# Patient Record
Sex: FEMALE | Race: WHITE | NOT HISPANIC OR LATINO | Employment: FULL TIME | URBAN - METROPOLITAN AREA
[De-identification: names, ages, dates, MRNs, and addresses within clinical notes are randomized per-mention and may not be internally consistent; named-entity substitution may affect disease eponyms.]

---

## 2018-11-13 ENCOUNTER — OFFICE VISIT CONVERTED (OUTPATIENT)
Dept: ONCOLOGY | Facility: HOSPITAL | Age: 23
End: 2018-11-13
Attending: INTERNAL MEDICINE

## 2018-12-04 ENCOUNTER — OFFICE VISIT CONVERTED (OUTPATIENT)
Dept: ONCOLOGY | Facility: HOSPITAL | Age: 23
End: 2018-12-04
Attending: INTERNAL MEDICINE

## 2019-01-17 ENCOUNTER — OFFICE VISIT CONVERTED (OUTPATIENT)
Dept: ONCOLOGY | Facility: HOSPITAL | Age: 24
End: 2019-01-17
Attending: NURSE PRACTITIONER

## 2019-01-17 ENCOUNTER — HOSPITAL ENCOUNTER (OUTPATIENT)
Dept: ONCOLOGY | Facility: HOSPITAL | Age: 24
Discharge: HOME | End: 2019-01-17
Attending: NURSE PRACTITIONER

## 2021-01-19 LAB
EXTERNAL HEPATITIS B SURFACE ANTIGEN: NEGATIVE
EXTERNAL HEPATITIS C AB: NEGATIVE
EXTERNAL RUBELLA QUALITATIVE: NORMAL
EXTERNAL VDRL: NEGATIVE
HIV1 P24 AG SERPL QL IA: NEGATIVE
HM PAP SMEAR: NORMAL

## 2021-01-22 ENCOUNTER — OFFICE VISIT (OUTPATIENT)
Dept: CARDIOLOGY | Facility: CLINIC | Age: 26
End: 2021-01-22

## 2021-01-22 VITALS
SYSTOLIC BLOOD PRESSURE: 130 MMHG | DIASTOLIC BLOOD PRESSURE: 82 MMHG | HEIGHT: 72 IN | WEIGHT: 161 LBS | HEART RATE: 73 BPM | BODY MASS INDEX: 21.81 KG/M2

## 2021-01-22 DIAGNOSIS — I10 ESSENTIAL HYPERTENSION: Primary | ICD-10-CM

## 2021-01-22 PROCEDURE — 93000 ELECTROCARDIOGRAM COMPLETE: CPT | Performed by: INTERNAL MEDICINE

## 2021-01-22 PROCEDURE — 99204 OFFICE O/P NEW MOD 45 MIN: CPT | Performed by: INTERNAL MEDICINE

## 2021-01-22 RX ORDER — ACETAMINOPHEN 500 MG
TABLET ORAL AS NEEDED
COMMUNITY
End: 2021-06-23

## 2021-01-22 RX ORDER — LABETALOL 100 MG/1
TABLET, FILM COATED ORAL
COMMUNITY
Start: 2020-10-12 | End: 2021-06-23

## 2021-01-22 RX ORDER — NIFEDIPINE 30 MG/1
30 TABLET, EXTENDED RELEASE ORAL DAILY
COMMUNITY
Start: 2020-10-26 | End: 2021-06-23

## 2021-01-22 RX ORDER — VITAMIN A ACETATE, BETA CAROTENE, ASCORBIC ACID, CHOLECALCIFEROL, .ALPHA.-TOCOPHEROL ACETATE, DL-, THIAMINE MONONITRATE, RIBOFLAVIN, NIACINAMIDE, PYRIDOXINE HYDROCHLORIDE, FOLIC ACID, CYANOCOBALAMIN, CALCIUM CARBONATE, FERROUS FUMARATE, ZINC OXIDE, CUPRIC OXIDE 3080; 12; 120; 400; 1; 1.84; 3; 20; 22; 920; 25; 200; 27; 10; 2 [IU]/1; UG/1; MG/1; [IU]/1; MG/1; MG/1; MG/1; MG/1; MG/1; [IU]/1; MG/1; MG/1; MG/1; MG/1; MG/1
1 TABLET, FILM COATED ORAL DAILY
COMMUNITY
End: 2022-08-22

## 2021-01-22 NOTE — PROGRESS NOTES
Subjective:     Encounter Date:01/22/2021      Patient ID: Ana Brito is a 25 y.o. female.    Chief Complaint: Hypertension  HPI:   This is a 25-year-old woman who presents for evaluation of hypertension.  She is 7 weeks pregnant.  She has chronic hypertension dating back to high school.  This is her second pregnancy, and with the first pregnancy she was induced at 37 weeks due to severe hypertension without help syndrome or eclampsia.  The patient was previously maintained on lisinopril within the last several years while not pregnant.  This apparently controlled her blood pressures relatively well.  She was transitioned to labetalol 100 twice daily when she became pregnant.  This did not control her pressures.  She was having diastolics in the 90s to 100s.  Nifedipine 30 mg daily was added.  Since then her blood pressures of averaged in the 130s over 80s.  When hypertensive she gets a severe headache and has visual disturbance as well.  She never has dizziness or presyncope.  She does not get paresthesias, chest pain or shortness of breath with hypertension.  Thus far the pregnancy has been uncomplicated.  She is feeling well.  She does not do any regular exercise.  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  There is no family history of hypertension or coronary disease.    The following portions of the patient's history were reviewed and updated as appropriate: allergies, current medications, past family history, past medical history, past social history, past surgical history and problem list.     REVIEW OF SYSTEMS:   All systems reviewed.  Pertinent positives identified in HPI.  All other systems are negative.    Past Medical History:   Diagnosis Date   • History of blood clot to lungs during pregnancy     After pregnancy   • Hypertension        Family History   Problem Relation Age of Onset   • Cancer Maternal Grandmother    • Hyperlipidemia Maternal Grandfather    • Cancer Paternal Grandmother         Social History     Socioeconomic History   • Marital status:      Spouse name: Not on file   • Number of children: Not on file   • Years of education: Not on file   • Highest education level: Not on file   Tobacco Use   • Smoking status: Never Smoker   • Smokeless tobacco: Never Used   Substance and Sexual Activity   • Alcohol use: Yes     Comment: occ   • Drug use: Not Currently       No Known Allergies    Past Surgical History:   Procedure Laterality Date   • KIDNEY STONE SURGERY  01/12/2021   • MULTIPLE TOOTH EXTRACTIONS           ECG 12 Lead    Date/Time: 1/22/2021 1:27 PM  Performed by: Gaby Cornejo MD  Authorized by: Gaby Cornejo MD   Comparison: not compared with previous ECG   Rhythm: sinus rhythm  Rate: normal  Conduction: conduction normal  ST Segments: ST segments normal  T Waves: T waves normal  QRS axis: normal  Other: no other findings    Clinical impression: normal ECG             Objective:         PHYSICAL EXAM:  GEN: VSS, no distress,   Eyes: normal sclera, normal lids and lashes  HENT: moist mucus membranes,   Respiratory: CTAB, no rales or wheezes  CV: RRR, no murmurs, , +2 DP and 2+ carotid pulses b/l  GI: NABS, soft,  Nontender, nondistended  MSK: no edema, no scoliosis or kyphosis  Skin: no rash, warm, dry  Heme/Lymph: no bruising or bleeding  Psych: organized thought, normal behavior and affect  Neuro: Cranial nerves grossly intact, Alert and Oriented x 3.         Assessment:          Diagnosis Plan   1. Essential hypertension            Plan:       This is a 25-year-old woman with chronic hypertension.  She is now 7 weeks pregnant.  Her blood pressures are well controlled on labetalol 100 twice daily and nifedipine 30.  She will continue to measure her blood pressures daily.  We will adjust medications accordingly.  As her pressures are controlled on 2 drugs I do not think she needs a secondary hypertension work-up at this point, though we can revisit this after her  pregnancy.  She will be seeing maternal-fetal medicine specialist which will help plan her delivery next week.    Teetee, thank you very much for referring this kind patient to me. Please call me with any questions or concerns. I will see the patient again in the office in June.         Gaby Cornejo MD  01/22/21  [facility]    Outpatient Encounter Medications as of 1/22/2021   Medication Sig Dispense Refill   • acetaminophen (TYLENOL) 500 MG tablet Take  by mouth As Needed.     • enoxaparin (LOVENOX) 40 MG/0.4ML solution syringe Inject  under the skin into the appropriate area as directed.     • labetalol (NORMODYNE) 100 MG tablet TK 1 T PO BID     • NIFEdipine XL (PROCARDIA XL) 30 MG 24 hr tablet Take 30 mg by mouth Daily.     • prenatal vitamins (PRENATAL 27-1) 27-1 MG tablet tablet Take 1 tablet by mouth Daily.       No facility-administered encounter medications on file as of 1/22/2021.

## 2021-01-27 ENCOUNTER — TELEPHONE CONVERTED (OUTPATIENT)
Dept: UROLOGY | Facility: CLINIC | Age: 26
End: 2021-01-27
Attending: UROLOGY

## 2021-05-14 NOTE — PROGRESS NOTES
Progress Note      Patient Name: Ana Brito   Patient ID: [id number]   Sex: Female   YOB: 1995        Visit Date: January 27, 2021    Provider: Norma Rodriguez MD   Location: [facility]   Location Address: [address] [place] [address]  [id number]   Location Phone: [phone]          History Of Present Illness  TELEHEALTH TELEPHONE VISIT  Ana Brito is a 25 year old female who is presenting for evaluation via telehealth telephone visit. Verbal consent obtained before beginning visit.   Provider spent 7 minutes with the patient during the telehealth visit.   The following staff were present during this visit: Shahla Bhatti   Past Medical History/ Overview of Patient Symptoms  The patient returns for a scheduled post-operative visit after undergoing left ureteroscopy and stone basket extraction for left ureteral calculus on 01/12/2021. A double J stent was inserted but has been removed by patient at home.   Since the procedure, the patient has had no significant problems.       Past Medical History  HTN (hypertension)         Past Surgical History  Kidney Stone Surgery, Unspecified; [place] Tooth Extraction         Medication List  labetalol 100 mg oral tablet; Lovenox subcutaneous; nifedipine 30 mg oral tablet extended release; Prenatal 400 mcg oral tablet,chewable         Allergy List  NO KNOWN DRUG ALLERGIES       Allergies Reconciled  Family Medical History  Kidney stones         Social History  Tobacco (Never)         Review of Systems  · Constitutional  o Denies  o : fever, chills  · Gastrointestinal  o Denies  o : nausea, vomiting, change in abdominal girth, diarrhea, constipation, blood in stools  · Genitourinary  o Denies  o : additional symptoms, except as noted in HPI              Assessment  · Calculus of distal left ureter     592.1/N20.1      Plan  · Orders  o Physican Telephone evaluation, 5-10 min ([id number]) - 592.1/N20.1 - 01/27/2021  o KUB xray [facility]  Preferred View ([id number]) - 592.1/N20.1 - 01/27/2022  · Medications  o Medications have been Reconciled  o Transition of Care or Provider Policy  · Instructions  o The left uteretral stent was removed today in the office without difficulty. The patient will return to the office in 12 months for KUB. I will see him/her sooner if he/she has any no stone symptoms.             Electronically Signed by: Norma Rodriguez MD -Author on January 27, 2021 01:08:10 PM

## 2021-05-28 VITALS
DIASTOLIC BLOOD PRESSURE: 88 MMHG | OXYGEN SATURATION: 99 % | SYSTOLIC BLOOD PRESSURE: 135 MMHG | HEIGHT: 62 IN | HEART RATE: 82 BPM | BODY MASS INDEX: 28.36 KG/M2 | WEIGHT: 154.1 LBS | TEMPERATURE: 98 F

## 2021-05-28 VITALS
DIASTOLIC BLOOD PRESSURE: 94 MMHG | SYSTOLIC BLOOD PRESSURE: 135 MMHG | HEART RATE: 86 BPM | HEART RATE: 82 BPM | BODY MASS INDEX: 28.4 KG/M2 | WEIGHT: 154.32 LBS | HEIGHT: 62 IN | HEIGHT: 62 IN | TEMPERATURE: 98.5 F | BODY MASS INDEX: 28.11 KG/M2 | SYSTOLIC BLOOD PRESSURE: 143 MMHG | WEIGHT: 152.78 LBS | DIASTOLIC BLOOD PRESSURE: 88 MMHG | OXYGEN SATURATION: 99 % | TEMPERATURE: 98.3 F | OXYGEN SATURATION: 100 %

## 2021-05-28 NOTE — PROGRESS NOTES
Patient: LUIS ENRIQUE WYNNE     Acct: [id number]     Report: #[id number]  UNIT #: [id number]     : 1995    Encounter Date:2018  PRIMARY CARE: JOSE MOORE  ***Signed***  --------------------------------------------------------------------------------------------------------------------  NURSE INTAKE      Visit Type      Established Patient Visit            Chief Complaint      PULMONARY EMBOLISM            Referring Provider/Copies To      Referring Provider:  JOSE MOORE            History and Present Illness      Past History      Mr. Wynne is a very pleasant 23-year-old lady who is being referred to us     because of recent history of pulmonary embolism. Patient presented with an     anxiety attack as well as chest and neck discomfort while at work. She was     evaluated in the emergency room with a CT scan of the chest because of her     history of hormone contraceptive therapy. Patient was found to have evidence of     pulmonary embolism in one of the segmental branches of the left lower lobe     pulmonary artery. Since then patient has been maintained on Eliquis     anticoagulation which she is tolerating very well. Patient also gives positive     family history of thromboembolism. One of her cousins had DVT and 2 of her     grandmothers also had history of thromboembolism and required anticoagulation.     Overall patient has significantly improved without any dyspnea or hypoxemia at     this point. She did not undergo any ultrasound evaluation of the lower     extremities.            July 3-.  Thrombophilia workup was negative      2018. Presents for follow up after initial evaluation for PE workup in    the ER and started on Eliquis in early . CT showed. PE in the LLL segmental     branch of the lung. Tolerating Elquis well. Will continue x 6 months.     Discontinued Nuva-ring.      .  WBC 4.75.  Hemoglobin 13.4.  Platelet count 237,000             HPI - Hematology Interim      Patient presents today for evaluation of chest pain in setting of pulmonary     thromboembolism.            PAST, FAMILY   Social History      Lives independently:  No            Tobacco Use      Tobacco status:  Never smoker            Alcohol Use      Alcohol intake:  OCCASSIONALLY            Substance Use      Substance use:  Denies use            REVIEW OF SYSTEMS      General:  Admits: Fatigue;          Denies: Appetite Change, Fever, Night Sweats, Weight Gain, Weight Loss      Eye:  Denies: Blurred Vision, Corrective Lenses, Diplopia, Eye Irritation, Eye     Pain, Eye Redness, Spots in Vision, Vision Loss      ENT:  Denies: Headache, Hearing Loss, Hoarseness, Seizures, Sinus Congestion,     Sore Throat      Cardiovascular:  Admits: Chest Pain;          Denies: Edema Ankles, Edema Legs, Irregular Heartbeat, Palpitations      Respiratory:  Denies: Coughing Blood, Productive Cough, Shortness of Air,     Wheezing      Gastrointestinal:  Denies: Bloody Stools, Constipation, Diarrhea, Frequent     Heartburn, Nausea, Problem Swallowing, Tarry Stools, Unable to Control Bowels,     Vomiting      Genitourinary (female):  Denies: Blood in Urine, Decrease Urine Stream, Frequent    Urination, Incontinence, Painful Urination      Musculoskeletal:  Denies: Back Pain, Leg Cramps, Muscle Pain, Muscle Weakness,     Painful Joints, Swollen Joints      Integumentary:  Denies: Bleeds Easily, Bruises Easily, Hair Changes, Jaundice,     Lesions, Mole Changes, Nail Changes, Pigment Changes, Rash, Skin Discoloration      Neurologic:  Denies: Dizziness, Fainting, Numbness\Tingling, Paralysis, Seizures      Psychiatric:  Denies: Anxiety, Confused, Depression, Disoriented, Memory Loss      Endocrine:  Denies: Cold Intolerance, Diabetes, Excessive Sweating, Excessive     Thirst, Excessive Urination, Heat Intolerance, Flushing, Hyperthyroidism,     Hypothyroidism      Hematologic/Lymphatic:  Denies:  Bruising, Bleeding, Enlarged Lymph Nodes,     Recurrent Infections, Transfusions            VITAL SIGNS AND SCORES      Vitals      Height 5 ft 1.93 in / 157.3 cm      Weight 152 lbs 12.460 oz / 69.3 kg      BSA 1.70 m2      BMI 28.0 kg/m2      Temperature 98.5 F / 36.94 C - Temporal      Pulse 86      Blood Pressure 143/88 Sitting, Left Arm      Pulse Oximetry 99%, ROOM AIR            Pain Score      Experiencing any pain?:  No      Pain Scale Used:  Numerical      Pain Intensity:  0            Fatigue Score      Experiencing any fatigue?:  No      Fatigue (0-10 scale):  0 (none)            EXAM      General Appearance:  Positive for: Alert, Oriented x3, Cooperative      Eye:  Positive for: Anicteric Sclerae, Moist Conjunctiva, PERRLA      HEENT:  Positive for: Oropharynx clear;          Negative for: Dentition, Erythema      Neck:  Positive for: Full ROM;          Negative for: JVD      Respiratory:  Positive for: CTAB;          Negative for: Barrel Chested, Crackles, Diminished Breath, Dullness to     Percussion, Normal Respiratory Effort, Rales, Rhochi, Stridor      Breast - Left:  Negative for: Adenopathy, Appearance, Discharge, Mass, Skin     Changes      Breast - Right:  Negative for: Adenopathy, Appearance, Discharge, Mass, Skin     Changes      Abdomen/Gastro:  Positive for: Normal Active Bowel Sounds;          Negative for: Hepatosplenomegaly      Cardiovascular:  Positive for: Normal PMI, RRR      Skin:  Positive for: Normal Temperature, Normal Texture and Turgor, Normal Tone      Psychiatric:  Positive for: AAO X 3, Appropriate Affect      Neurologic:  Positive for: Cranial Ner II-XII Intact, Deep Tendon Reflexes      Musculoskeletal:  Positive for: Full ROM Lower Extremety, Full ROM Upper     Extremety, Full Muscle Strength      Lower Extremities:  Positive for: Normal Gait and Station;          Negative for: Edema      Upper Extremities:  Negative for: Clubbing, Deformities, Digital Cyanosis,      Digital Ischemia, Edema, Weakness      Lymphatic:  Negative for: Axillary, Cervical, Epitrochlear, Femoral,     Infraclavicular, Inguinal, Occipital, Popliteal, Posterior auricular,     Preauricular, Supraclavicular            PREVENTION      Hx Influenza Vaccination:  Yes      Date Influenza Vaccine Given:  Oct 1, 2017      Influenza Vaccine Declined:  No      2 or More Falls Past Year?:  No      Fall Past Year with Injury?:  No      Hx Pneumococcal Vaccination:  No      Encouraged to follow-up with:  PCP regarding preventative exams.      Chart initiated by      DARIANA GEIGER CMA            ALLERGY/MEDS      Allergies      Coded Allergies:             NO KNOWN DRUG ALLERGIES (Verified  Allergy, Unknown, 11/13/18)            Medications      Last Reconciled on 11/15/18 08:18 by KASIE LOPEZ MD      Apixaban (Eliquis) 5 Mg Tablet      5 MG PO BID for 30 Days, #60 TAB         Reported         6/28/18      Medications Reviewed:  No Changes made to meds            IMPRESSION/PLAN      Diagnosis      Pulmonary embolism - I26.99      -Provoked PE.  Due to underlying estrogen-based oral contraceptive use and     decreased mobility      -Continue with Eliquis 5 mg BID. Patient will continue Eliquis for 6 months. She    was started on Eliquis in early June.       -Thrombophilia workup is negative.       -Discussed with patient completing 6 months of treatment      -D-dimer level was normal            Chest pain - R07.9      -Nonpruritic chest pain      -Reproducible with palpation in the right rib area      -Likely costochondritis      -Recommend NSAIDs            Iron deficiency - E61.1      -Symptoms have improved      Check iron levels are next visit            Notes      -Today's visit is an encounter for hematology evaluation and treatment.       -Patient's radiology exams, blood tests, physicians' notes, and medications were    reviewed today to assess patient's medical treatment plan.      -Radiology imaging  tests from June 2018 to today were reviewed independently by     me by direct visualization of the images.        -Old medical records were reviewed and summarized in chronological order in the     HPI today to maintain an updated medical record.      New Diagnostics      * CBC With Auto Diff, Routine         Dx: Pulmonary embolism - I26.99      * D-Dimer Quantitative, Routine         Dx: Pulmonary embolism - I26.99            Plan      -Return to clinic in 1 month to discuss stopping Eliquis.  Check d-dimer for     chest pain            Patient Education      Patient Education Provided:  Yes                 Disclaimer: Converted document may not contain table formatting or lab diagrams. Please see [employer] for the authenticated document.

## 2021-05-28 NOTE — PROGRESS NOTES
Patient: LUIS ENRIQUE WYNNE     Acct: [id number]     Report: #[id number]  UNIT #: [id number]     : 1995    Encounter Date:2019  PRIMARY CARE: JOSE MOORE  ***Signed***  --------------------------------------------------------------------------------------------------------------------  NURSE INTAKE      Visit Type      Established Patient Visit            Chief Complaint      Pulmonary emboli (LLL pulmonary artery diagnosed 2018)            Referring Provider/Copies To      Referring Provider:  JOSE MOORE      Primary Care Provider:  JOSE MOORE      Copies To:   Cristal Grace ;            History and Present Illness      Past History      Mr. Wynne is a very pleasant 23-year-old lady who is being referred to us     because of recent history of pulmonary embolism seen in ER on 2018.     Patient presented with an anxiety attack as well as chest and neck discomfort     while at work. She was evaluated in the emergency room with a CT scan of the     chest because of her history of hormone contraceptive therapy. Patient was found    to have evidence of pulmonary embolism in one of the segmental branches of the     left lower lobe pulmonary artery. Since then patient has been maintained on     Eliquis anticoagulation which she is tolerating very well. Patient also gives     positive family history of thromboembolism. One of her cousins had DVT and 2 of     her grandmothers also had history of thromboembolism and required     anticoagulation.  Thrombophilia work up completed and was deemed negative.  Plan    is to maintain her on anticoagulation for 6 months.  She now has an IUD.            HPI - Hematology Interim      Here today in follow up.  Has been of anticoagulation x 6 weeks.  No longer     taking oral contraceptives. Has an IUD in place.  Has had no SOA, RAMÍREZ, leg     swelling or leg pain.            PAST, FAMILY   Past Medical History      Other PMH      NONE      Other  Hematology/Oncology      PE            Past Surgical History      ORAL SURGERY 10 TEETH REMOVED            Family History      Family History:  Breast Cancer (GREAT AUNT, MATERNAL GRANDMOTHER), Colorectal     Cancer (MATERNAL GRANDMOTHER)      MOMS MOTHERS SISTER-BLOOD CLOT IN LEGS            Social History      Marital Status:        Lives independently:  No            Tobacco Use      Tobacco status:  Never smoker            Alcohol Use      Alcohol intake:  OCCASSIONALLY            Substance Use      Substance use:  Denies use            REVIEW OF SYSTEMS      General:  Denies: Appetite Change, Fatigue, Fever, Night Sweats, Weight Gain,     Weight Loss      Eye:  Denies: Blurred Vision, Corrective Lenses, Diplopia, Eye Irritation, Eye     Pain, Eye Redness, Spots in Vision, Vision Loss      ENT:  Denies: Headache, Hearing Loss, Hoarseness, Seizures, Sinus Congestion,     Sore Throat      Cardiovascular:  Denies: Chest Pain, Edema Ankles, Edema Legs, Irregular     Heartbeat, Palpitations      Respiratory:  Denies: Coughing Blood, Productive Cough, Shortness of Air,     Wheezing      Gastrointestinal:  Denies: Bloody Stools, Constipation, Diarrhea, Frequent     Heartburn, Nausea, Problem Swallowing, Tarry Stools, Unable to Control Bowels,     Vomiting      Genitourinary (female):  Denies: Blood in Urine, Decrease Urine Stream, Frequent    Urination, Incontinence, Painful Urination      Musculoskeletal:  Denies: Back Pain, Leg Cramps, Muscle Pain, Muscle Weakness,     Painful Joints, Swollen Joints      Integumentary:  Denies: Bleeds Easily, Bruises Easily, Hair Changes, Jaundice,     Lesions, Mole Changes, Nail Changes, Pigment Changes, Rash, Skin Discoloration      Neurologic:  Denies: Dizziness, Fainting, Numbness\Tingling, Paralysis, Seizures      Psychiatric:  Denies: Anxiety, Confused, Depression, Disoriented, Memory Loss      Endocrine:  Denies: Cold Intolerance, Diabetes, Excessive Sweating, Excessive      Thirst, Excessive Urination, Heat Intolerance, Flushing, Hyperthyroidism,     Hypothyroidism      Hematologic/Lymphatic:  Denies: Bruising, Bleeding, Enlarged Lymph Nodes,     Recurrent Infections, Transfusions      Reproductive:  Denies: Menopause, Heavy Periods, Pregnant, Still Menstruating            VITAL SIGNS AND SCORES      Vitals      Height 5 ft 1.93 in / 157.3 cm      Weight 154 lbs 1.625 oz / 69.9 kg      BSA 1.71 m2      BMI 28.3 kg/m2      Temperature 98.0 F / 36.67 C - Temporal      Pulse 82      Blood Pressure 135/88 Sitting, Left Arm      Pulse Oximetry 99%, ROOM AIR            Pain Score      Experiencing any pain?:  No      Pain Scale Used:  Numerical      Pain Intensity:  0            Fatigue Score      Experiencing any fatigue?:  No      Fatigue (0-10 scale):  0 (none)            EXAM      General Appearance:  Positive for: Alert, Oriented x3, Cooperative      Eye:  Positive for: Anicteric Sclerae, Moist Conjunctiva, PERRLA      HEENT:  Positive for: Oropharynx clear;          Negative for: Erythema, Exudates, Pallor      Neck:  Positive for: Full ROM, Supple;          Negative for: Masses      Respiratory:  Positive for: CTAB, Normal Respiratory Effort;          Negative for: Diminished Breath      Abdomen/Gastro:  Positive for: Normal Active Bowel Sounds, Soft;          Negative for: Hepatosplenomegaly, Tenderness      Cardiovascular:  Positive for: Normal PMI, RRR;          Negative for: Peripheral Edema      Psychiatric:  Positive for: AAO X 3, Appropriate Affect, Intact Judgement      Neurologic:  Positive for: Cranial Ner II-XII Intact      Musculoskeletal:  Positive for: Full ROM Lower Extremety, Full Muscle Strength,     Full Muscle Tone      Lymphatic:  Negative for: Axillary, Cervical, Infraclavicular, Supraclavicular            PREVENTION      Hx Influenza Vaccination:  Yes      Date Influenza Vaccine Given:  Oct 1, 2017      Influenza Vaccine Declined:  No      2 or More Falls  Past Year?:  No      Fall Past Year with Injury?:  No      Hx Pneumococcal Vaccination:  No      Encouraged to follow-up with:  PCP regarding preventative exams.      Chart initiated by      DARIANA GEIGER CMA            ALLERGY/MEDS      Allergies      Coded Allergies:             NO KNOWN DRUG ALLERGIES (Verified  Allergy, Unknown, 1/17/19)            Medications      Last Reconciled on 12/7/18 17:39 by KASIE LOPEZ MD      No Medication Information Entered            Medications Reviewed:  No Changes made to meds            IMPRESSION/PLAN      Impression      Pulmonary emboli June 2018 caused by oral contraceptives, status post 6 months     of anticoagulation            Diagnosis      Pulmonary embolism         Acute pulmonary embolism without acute cor pulmonale, unspecified pulmonary        embolism type - I26.99         Pulmonary embolism type: unspecified         Chronicity: acute         Acute cor pulmonale presence: without acute cor pulmonale            Plan      1. Off of oral contraceptives since June 2018.  Eliquis discontinued 12/2018.      Thrombophilia work up negative.  Will discharge from practice.  She can follow     with PCP.  Will be glad to see her back if needed in future.  Thank you for     allowing participation in this nice lady's care.            Patient Education      Patient Education Provided:  Yes            Topics Patient Counseled on      Discussed s/sxs of deep vein thrombosis and what to observe and to seek     immediate attention if develop.            Alcohol Counseling      Counseling given:  None            Substance Counseling      Counseling given:  None                 Disclaimer: Converted document may not contain table formatting or lab diagrams. Please see [employer] for the authenticated document.

## 2021-05-28 NOTE — PROGRESS NOTES
Patient: LUIS ENRIQUE WYNNE     Acct: [id number]     Report: #[id number]  UNIT #: [id number]     : 1995    Encounter Date:2018  PRIMARY CARE: JOSE MOORE  ***Signed***  --------------------------------------------------------------------------------------------------------------------  NURSE INTAKE      Visit Type      Established Patient Visit            Chief Complaint      P.E            Referring Provider/Copies To      Referring Provider:  JOSE MOORE            History and Present Illness      Past History      Mr. Wynne is a very pleasant 23-year-old lady who is being referred to us     because of recent history of pulmonary embolism. Patient presented with an     anxiety attack as well as chest and neck discomfort while at work. She was     evaluated in the emergency room with a CT scan of the chest because of her     history of hormone contraceptive therapy. Patient was found to have evidence of     pulmonary embolism in one of the segmental branches of the left lower lobe     pulmonary artery. Since then patient has been maintained on Eliquis     anticoagulation which she is tolerating very well. Patient also gives positive     family history of thromboembolism. One of her cousins had DVT and 2 of her     grandmothers also had history of thromboembolism and required anticoagulation.     Overall patient has significantly improved without any dyspnea or hypoxemia at     this point. She did not undergo any ultrasound evaluation of the lower     extremities.            July 3-2018.  Thrombophilia workup was negative      2018. Presents for follow up after initial evaluation for PE workup in    the ER and started on Eliquis in early . CT showed. PE in the LLL segmental     branch of the lung. Tolerating Elquis well. Will continue x 6 months.     Discontinued Nuva-ring.      .  WBC 4.75.  Hemoglobin 13.4.  Platelet count 237,000      .   Patient presents for evaluation of PE.  Doing well.            HPI - Hematology Interim      Patient presents with fatigue            PAST, FAMILY   Social History      Lives independently:  No            Tobacco Use      Tobacco status:  Never smoker            Alcohol Use      Alcohol intake:  OCCASSIONALLY            Substance Use      Substance use:  Denies use            REVIEW OF SYSTEMS      General:  Denies: Appetite Change, Fatigue, Fever, Night Sweats, Weight Gain,     Weight Loss      Eye:  Denies: Blurred Vision, Corrective Lenses, Diplopia, Eye Irritation, Eye     Pain, Eye Redness, Spots in Vision, Vision Loss      ENT:  Denies: Headache, Hearing Loss, Hoarseness, Seizures, Sinus Congestion,     Sore Throat      Cardiovascular:  Admits: Chest Pain;          Denies: Edema Ankles, Edema Legs, Irregular Heartbeat, Palpitations      Respiratory:  Denies: Coughing Blood, Productive Cough, Shortness of Air,     Wheezing      Gastrointestinal:  Denies: Bloody Stools, Constipation, Diarrhea, Frequent     Heartburn, Nausea, Problem Swallowing, Tarry Stools, Unable to Control Bowels,     Vomiting      Genitourinary (female):  Denies: Blood in Urine, Decrease Urine Stream, Frequent    Urination, Incontinence, Painful Urination      Musculoskeletal:  Denies: Back Pain, Leg Cramps, Muscle Pain, Muscle Weakness,     Painful Joints, Swollen Joints      Integumentary:  Denies: Bleeds Easily, Bruises Easily, Hair Changes, Jaundice,     Lesions, Mole Changes, Nail Changes, Pigment Changes, Rash, Skin Discoloration      Neurologic:  Denies: Dizziness, Fainting, Numbness\Tingling, Paralysis, Seizures      Psychiatric:  Denies: Anxiety, Confused, Depression, Disoriented, Memory Loss      Endocrine:  Denies: Cold Intolerance, Diabetes, Excessive Sweating, Excessive     Thirst, Excessive Urination, Heat Intolerance, Flushing, Hyperthyroidism,     Hypothyroidism      Hematologic/Lymphatic:  Denies: Bruising, Bleeding,  Enlarged Lymph Nodes,     Recurrent Infections, Transfusions      Reproductive:  Denies: Menopause, Heavy Periods, Pregnant, Still Menstruating            VITAL SIGNS AND SCORES      Vitals      Height 5 ft 1.93 in / 157.3 cm      Weight 154 lbs 5.152 oz / 70.0 kg      BSA 1.71 m2      BMI 28.3 kg/m2      Temperature 98.3 F / 36.83 C - Temporal      Pulse 82      Blood Pressure 135/94 Sitting, Left Arm      Pulse Oximetry 100%, ROOM AIR            Pain Score      Experiencing any pain?:  Yes      Pain Scale Used:  Numerical      Pain Intensity:  2            Fatigue Score      Experiencing any fatigue?:  No      Fatigue (0-10 scale):  0 (none)            EXAM      General Appearance:  Positive for: Alert, Oriented x3, Cooperative      Eye:  Positive for: Anicteric Sclerae, Moist Conjunctiva, PERRLA      HEENT:  Positive for: Oropharynx clear, Dentition;          Negative for: Erythema      Neck:  Positive for: Full ROM, JVD;          Negative for: Masses      Respiratory:  Positive for: CTAB, Normal Respiratory Effort      Abdomen/Gastro:  Positive for: Normal Active Bowel Sounds;          Negative for: Hepatosplenomegaly      Cardiovascular:  Positive for: Normal PMI;          Negative for: JVD, Murmur      Skin:  Positive for: Normal Temperature, Normal Texture and Turgor, Normal Tone      Psychiatric:  Positive for: AAO X 3, Appropriate Affect      Neurologic:  Positive for: Cranial Ner II-XII Intact, Deep Tendon Reflexes      Musculoskeletal:  Positive for: Full ROM Lower Extremety, Full ROM Upper     Extremety, Full Muscle Strength      Lymphatic:  Negative for: Axillary, Cervical, Epitrochlear, Femoral,     Infraclavicular, Inguinal, Occipital, Popliteal, Posterior auricular,     Preauricular, Supraclavicular            PREVENTION      Hx Influenza Vaccination:  Yes      Date Influenza Vaccine Given:  Oct 1, 2017      Influenza Vaccine Declined:  No      2 or More Falls Past Year?:  No      Fall Past Year with  Injury?:  No      Hx Pneumococcal Vaccination:  No      Encouraged to follow-up with:  PCP regarding preventative exams.      Chart initiated by      DARIANA GEIGER [facility]            ALLERGY/MEDS      Allergies      Coded Allergies:             NO KNOWN DRUG ALLERGIES (Verified  Allergy, Unknown, 12/4/18)            Medications      Last Reconciled on 12/7/18 17:39 by KASIE LOPEZ MD      Apixaban (Eliquis) 5 Mg Tablet      5 MG PO BID for 30 Days, #60 TAB         Reported         6/28/18      Medications Reviewed:  No Changes made to meds            IMPRESSION/PLAN      Impression      Pulmonary embolism - I26.99      -Provoked PE.  Due to underlying estrogen-based oral contraceptive use and     decreased mobility      -Continue with Eliquis 5 mg BID. Patient will continue Eliquis for 6 months. She    was started on Eliquis in early June.       -Thrombophilia workup is negative.       -Discussed with patient completing 6 months of treatment      -D-dimer level was normal on November       -Patient completed 6 months of treatment.  Patient will stop Eliquis      -Counseled patient if she has any leg swelling or chest pain to call us right     away for further evaluation.  Patient is aware that stopping treatment may     increase risk of PE.  However patient stated her preference is to be off of     blood thinner            Chest pain - R07.9      -Nonpruritic chest pain      -Reproducible with palpation in the right rib area      -Likely costochondritis      -Recommend NSAIDs      -Improved            Iron deficiency - E61.1      -Symptoms have improved      -Check iron levels are next visit      -Improved            Neutropenia      -Check CBC      -Continue monitor            Diagnosis      Notes      -Interval clinic visit changes      -Patient's imaging exams, blood tests, physicians' notes, and any new findings     since our last clinic visit were reviewed today to reassess patient's medical     treatment  plan. .       -Old medical records were re-reviewed and re-summarized in chronological order     in the HPI today to maintain an updated medical record.       -Patient's radiology imaging tests from our last visit were reviewed     independently by me by direct visualization of the images.        -Patients current lab tests and medications were carefully reviewed to evaluate     patient's current treatment plan today.       -Patient was advised to call us right away if there was any new symptoms for an     urgent visit.  Patient voiced understanding and agreed to do so.            Plan      Counseled patient to call us for an urgent visit if needed.  Check blood tests     and/or imaging tests as shown above.  Return to clinic in 6-week            Patient Education      Patient Education Provided:  Yes                 Disclaimer: Converted document may not contain table formatting or lab diagrams. Please see [employer] for the authenticated document.

## 2021-06-02 ENCOUNTER — TRANSCRIBE ORDERS (OUTPATIENT)
Dept: ADMINISTRATIVE | Facility: HOSPITAL | Age: 26
End: 2021-06-02

## 2021-06-02 DIAGNOSIS — O26.899 RH NEGATIVE, ANTEPARTUM: Primary | ICD-10-CM

## 2021-06-02 DIAGNOSIS — Z67.91 RH NEGATIVE, ANTEPARTUM: Primary | ICD-10-CM

## 2021-06-23 ENCOUNTER — LAB (OUTPATIENT)
Dept: LAB | Facility: HOSPITAL | Age: 26
End: 2021-06-23

## 2021-06-23 ENCOUNTER — TRANSCRIBE ORDERS (OUTPATIENT)
Dept: ADMINISTRATIVE | Facility: HOSPITAL | Age: 26
End: 2021-06-23

## 2021-06-23 ENCOUNTER — HOSPITAL ENCOUNTER (OUTPATIENT)
Dept: INFUSION THERAPY | Facility: HOSPITAL | Age: 26
Discharge: HOME | End: 2021-06-23

## 2021-06-23 VITALS
WEIGHT: 160.5 LBS | BODY MASS INDEX: 29.53 KG/M2 | OXYGEN SATURATION: 100 % | DIASTOLIC BLOOD PRESSURE: 74 MMHG | TEMPERATURE: 98.5 F | HEIGHT: 62 IN | SYSTOLIC BLOOD PRESSURE: 113 MMHG | HEART RATE: 95 BPM | RESPIRATION RATE: 20 BRPM

## 2021-06-23 DIAGNOSIS — Z67.91 RH NEGATIVE, ANTEPARTUM: Primary | ICD-10-CM

## 2021-06-23 DIAGNOSIS — O26.899 RH NEGATIVE STATE IN ANTEPARTUM PERIOD: Primary | ICD-10-CM

## 2021-06-23 DIAGNOSIS — O26.899 RH NEGATIVE, ANTEPARTUM: Primary | ICD-10-CM

## 2021-06-23 DIAGNOSIS — Z67.91 RH NEGATIVE STATE IN ANTEPARTUM PERIOD: ICD-10-CM

## 2021-06-23 DIAGNOSIS — O26.899 RH NEGATIVE STATE IN ANTEPARTUM PERIOD: ICD-10-CM

## 2021-06-23 DIAGNOSIS — Z67.91 RH NEGATIVE STATE IN ANTEPARTUM PERIOD: Primary | ICD-10-CM

## 2021-06-23 LAB
ABO GROUP BLD: NORMAL
BLD GP AB SCN SERPL QL: NEGATIVE
NUMBER OF DOSES: NORMAL
RH BLD: NEGATIVE

## 2021-06-23 PROCEDURE — 86850 RBC ANTIBODY SCREEN: CPT

## 2021-06-23 PROCEDURE — 86901 BLOOD TYPING SEROLOGIC RH(D): CPT

## 2021-06-23 PROCEDURE — 86900 BLOOD TYPING SEROLOGIC ABO: CPT

## 2021-06-23 PROCEDURE — 96372 THER/PROPH/DIAG INJ SC/IM: CPT

## 2021-06-23 PROCEDURE — 25010000002 RHO D IMMUNE GLOBULIN 1500 UNIT/2ML SOLUTION PREFILLED SYRINGE: Performed by: OBSTETRICS & GYNECOLOGY

## 2021-06-23 PROCEDURE — 36415 COLL VENOUS BLD VENIPUNCTURE: CPT

## 2021-06-23 RX ORDER — FERROUS SULFATE 325(65) MG
1 TABLET ORAL
COMMUNITY
Start: 2021-02-18 | End: 2022-08-22

## 2021-06-23 RX ORDER — ASPIRIN 81 MG
81 TABLET, DELAYED RELEASE (ENTERIC COATED) ORAL DAILY
Status: ON HOLD | COMMUNITY
Start: 2021-05-20 | End: 2021-08-26

## 2021-06-23 RX ADMIN — HUMAN RHO(D) IMMUNE GLOBULIN 1500 UNITS: 1500 SOLUTION INTRAMUSCULAR; INTRAVENOUS at 14:41

## 2021-08-10 ENCOUNTER — HOSPITAL ENCOUNTER (OUTPATIENT)
Facility: HOSPITAL | Age: 26
Discharge: HOME | End: 2021-08-11
Attending: OBSTETRICS & GYNECOLOGY | Admitting: OBSTETRICS & GYNECOLOGY

## 2021-08-10 PROCEDURE — 85027 COMPLETE CBC AUTOMATED: CPT | Performed by: OBSTETRICS & GYNECOLOGY

## 2021-08-10 PROCEDURE — G0463 HOSPITAL OUTPT CLINIC VISIT: HCPCS

## 2021-08-10 PROCEDURE — 80053 COMPREHEN METABOLIC PANEL: CPT | Performed by: OBSTETRICS & GYNECOLOGY

## 2021-08-11 VITALS
WEIGHT: 166.45 LBS | TEMPERATURE: 98.5 F | OXYGEN SATURATION: 100 % | BODY MASS INDEX: 30.63 KG/M2 | HEART RATE: 72 BPM | DIASTOLIC BLOOD PRESSURE: 79 MMHG | SYSTOLIC BLOOD PRESSURE: 122 MMHG | RESPIRATION RATE: 18 BRPM | HEIGHT: 62 IN

## 2021-08-11 LAB
ALBUMIN SERPL-MCNC: 3.6 G/DL (ref 3.5–5.2)
ALBUMIN/GLOB SERPL: 1.5 G/DL
ALP SERPL-CCNC: 120 U/L (ref 39–117)
ALT SERPL W P-5'-P-CCNC: 7 U/L (ref 1–33)
ANION GAP SERPL CALCULATED.3IONS-SCNC: 11.2 MMOL/L (ref 5–15)
AST SERPL-CCNC: 12 U/L (ref 1–32)
BILIRUB BLD-MCNC: NEGATIVE MG/DL
BILIRUB SERPL-MCNC: 0.2 MG/DL (ref 0–1.2)
BUN SERPL-MCNC: 6 MG/DL (ref 6–20)
BUN/CREAT SERPL: 10.7 (ref 7–25)
CALCIUM SPEC-SCNC: 9.4 MG/DL (ref 8.6–10.5)
CHLORIDE SERPL-SCNC: 105 MMOL/L (ref 98–107)
CLARITY, POC: CLEAR
CO2 SERPL-SCNC: 20.8 MMOL/L (ref 22–29)
COLOR UR: YELLOW
CREAT SERPL-MCNC: 0.56 MG/DL (ref 0.57–1)
CREAT UR-MCNC: 30.2 MG/DL
DEPRECATED RDW RBC AUTO: 45 FL (ref 37–54)
ERYTHROCYTE [DISTWIDTH] IN BLOOD BY AUTOMATED COUNT: 13.8 % (ref 12.3–15.4)
GFR SERPL CREATININE-BSD FRML MDRD: 132 ML/MIN/1.73
GLOBULIN UR ELPH-MCNC: 2.4 GM/DL
GLUCOSE SERPL-MCNC: 88 MG/DL (ref 65–99)
GLUCOSE UR STRIP-MCNC: NEGATIVE MG/DL
HCT VFR BLD AUTO: 34.6 % (ref 34–46.6)
HGB BLD-MCNC: 12 G/DL (ref 12–15.9)
KETONES UR QL: NEGATIVE
LEUKOCYTE EST, POC: ABNORMAL
MCH RBC QN AUTO: 31.3 PG (ref 26.6–33)
MCHC RBC AUTO-ENTMCNC: 34.7 G/DL (ref 31.5–35.7)
MCV RBC AUTO: 90.1 FL (ref 79–97)
NITRITE UR-MCNC: NEGATIVE MG/ML
PH UR: 7 [PH] (ref 5–8)
PLATELET # BLD AUTO: 191 10*3/MM3 (ref 140–450)
PMV BLD AUTO: 11.7 FL (ref 6–12)
POTASSIUM SERPL-SCNC: 3.8 MMOL/L (ref 3.5–5.2)
PROT SERPL-MCNC: 6 G/DL (ref 6–8.5)
PROT UR STRIP-MCNC: NEGATIVE MG/DL
PROT UR-MCNC: 4.7 MG/DL
PROT/CREAT UR: 0.2 MG/G{CREAT}
RBC # BLD AUTO: 3.84 10*6/MM3 (ref 3.77–5.28)
RBC # UR STRIP: ABNORMAL /UL
SODIUM SERPL-SCNC: 137 MMOL/L (ref 136–145)
SP GR UR: 1.01 (ref 1–1.03)
UROBILINOGEN UR QL: NORMAL
WBC # BLD AUTO: 10.08 10*3/MM3 (ref 3.4–10.8)

## 2021-08-11 PROCEDURE — 59025 FETAL NON-STRESS TEST: CPT

## 2021-08-11 PROCEDURE — 81002 URINALYSIS NONAUTO W/O SCOPE: CPT | Performed by: OBSTETRICS & GYNECOLOGY

## 2021-08-11 PROCEDURE — P9612 CATHETERIZE FOR URINE SPEC: HCPCS

## 2021-08-11 PROCEDURE — 84156 ASSAY OF PROTEIN URINE: CPT | Performed by: OBSTETRICS & GYNECOLOGY

## 2021-08-11 PROCEDURE — G0463 HOSPITAL OUTPT CLINIC VISIT: HCPCS

## 2021-08-11 PROCEDURE — 82570 ASSAY OF URINE CREATININE: CPT | Performed by: OBSTETRICS & GYNECOLOGY

## 2021-08-11 NOTE — NURSING NOTE
notified of  35.2 weeks presented with c/o of HA with elevated B/P's at home, pt. Has hx: PIH with last pregnancy, of B/P's obtained since admission with diastolic's in the 80-90's, of no ctx: or uterine irrit. Noted, fhr reactive, no epigastric pain noted with assess, clonus neg, reflexes +2, orders received to continue serial b/p's, obtain CBC,CMP, and PC ratio, call back with results.

## 2021-08-11 NOTE — NURSING NOTE
35.2 weeks presents with c/o elevated b/p at home with a HA that hasn't responded to Tylenol, pt. States hx: PIH with last pregnancy, was induced at 37 weeks, states she was seen by  today and was told to come in if her diastolic was 90's or greater, states b/p was 138/96 at home, pt. Denies swelling or epigastric pain, denies ctx:, leakage of fluid, or vaginal bleeding, states +fm noted, no ctx: noted on monitor, abd. Soft, non-tender with palpation, [id number], reflexes +2, trace edema noted in lower extremities, clonus neg., urine dip test performed,  to be notified.

## 2021-08-11 NOTE — NURSING NOTE
Pt. D/c home in stable condition, PIH precautions, home and follow up care instructions provided, pt. Voiced understanding.

## 2021-08-11 NOTE — NURSING NOTE
notified of lab results, of serials b/p's obtained, reactive fhr, orders received to d/c home, pt. To follow up with  as scheduled.

## 2021-08-11 NOTE — SIGNIFICANT NOTE
08/11/21 0225   Nonstress Test   Reason for NST OB Triage   Acoustic Stimulator No   Uterine Irritability No   Contractions Not present   Fetal Assessment   Fetal Movement active   Fetal HR Assessment Method external   Fetal HR (beats/min) 125   Fetal Heart Baseline Rate normal range   Fetal HR Variability moderate (amplitude range 6 to 25 bpm)   Fetal HR Accelerations episodic;greater than/equal to 15 bpm   Fetal HR Decelerations absent   Sinusoidal Pattern Present absent   Interpretation A   Nonstress Test Interpretation A Reactive

## 2021-08-13 ENCOUNTER — HOSPITAL ENCOUNTER (OUTPATIENT)
Facility: HOSPITAL | Age: 26
Discharge: HOME | End: 2021-08-13
Attending: OBSTETRICS & GYNECOLOGY | Admitting: OBSTETRICS & GYNECOLOGY

## 2021-08-13 VITALS
HEART RATE: 83 BPM | DIASTOLIC BLOOD PRESSURE: 86 MMHG | RESPIRATION RATE: 16 BRPM | TEMPERATURE: 98 F | SYSTOLIC BLOOD PRESSURE: 124 MMHG

## 2021-08-13 LAB
ALBUMIN SERPL-MCNC: 3.7 G/DL (ref 3.5–5.2)
ALBUMIN/GLOB SERPL: 1.4 G/DL
ALP SERPL-CCNC: 125 U/L (ref 39–117)
ALT SERPL W P-5'-P-CCNC: 8 U/L (ref 1–33)
ANION GAP SERPL CALCULATED.3IONS-SCNC: 11.2 MMOL/L (ref 5–15)
AST SERPL-CCNC: 13 U/L (ref 1–32)
BILIRUB SERPL-MCNC: 0.2 MG/DL (ref 0–1.2)
BUN SERPL-MCNC: 6 MG/DL (ref 6–20)
BUN/CREAT SERPL: 9.2 (ref 7–25)
CALCIUM SPEC-SCNC: 9.1 MG/DL (ref 8.6–10.5)
CHLORIDE SERPL-SCNC: 103 MMOL/L (ref 98–107)
CO2 SERPL-SCNC: 20.8 MMOL/L (ref 22–29)
CREAT SERPL-MCNC: 0.65 MG/DL (ref 0.57–1)
CREAT UR-MCNC: 46.7 MG/DL
DEPRECATED RDW RBC AUTO: 44.9 FL (ref 37–54)
ERYTHROCYTE [DISTWIDTH] IN BLOOD BY AUTOMATED COUNT: 13.6 % (ref 12.3–15.4)
GFR SERPL CREATININE-BSD FRML MDRD: 111 ML/MIN/1.73
GLOBULIN UR ELPH-MCNC: 2.7 GM/DL
GLUCOSE SERPL-MCNC: 69 MG/DL (ref 65–99)
HCT VFR BLD AUTO: 36.7 % (ref 34–46.6)
HGB BLD-MCNC: 12.6 G/DL (ref 12–15.9)
MCH RBC QN AUTO: 30.8 PG (ref 26.6–33)
MCHC RBC AUTO-ENTMCNC: 34.3 G/DL (ref 31.5–35.7)
MCV RBC AUTO: 89.7 FL (ref 79–97)
PLATELET # BLD AUTO: 200 10*3/MM3 (ref 140–450)
PMV BLD AUTO: 11.5 FL (ref 6–12)
POTASSIUM SERPL-SCNC: 3.8 MMOL/L (ref 3.5–5.2)
PROT SERPL-MCNC: 6.4 G/DL (ref 6–8.5)
PROT UR-MCNC: 5.9 MG/DL
PROT/CREAT UR: 0.1 MG/G{CREAT}
RBC # BLD AUTO: 4.09 10*6/MM3 (ref 3.77–5.28)
SODIUM SERPL-SCNC: 135 MMOL/L (ref 136–145)
WBC # BLD AUTO: 8.6 10*3/MM3 (ref 3.4–10.8)

## 2021-08-13 PROCEDURE — 80053 COMPREHEN METABOLIC PANEL: CPT | Performed by: OBSTETRICS & GYNECOLOGY

## 2021-08-13 PROCEDURE — 96372 THER/PROPH/DIAG INJ SC/IM: CPT

## 2021-08-13 PROCEDURE — 59025 FETAL NON-STRESS TEST: CPT

## 2021-08-13 PROCEDURE — 25010000002 BETAMETHASONE ACET & SOD PHOS PER 4 MG: Performed by: OBSTETRICS & GYNECOLOGY

## 2021-08-13 PROCEDURE — 84156 ASSAY OF PROTEIN URINE: CPT | Performed by: OBSTETRICS & GYNECOLOGY

## 2021-08-13 PROCEDURE — 82570 ASSAY OF URINE CREATININE: CPT | Performed by: OBSTETRICS & GYNECOLOGY

## 2021-08-13 PROCEDURE — 85027 COMPLETE CBC AUTOMATED: CPT | Performed by: OBSTETRICS & GYNECOLOGY

## 2021-08-13 RX ORDER — ACETAMINOPHEN 500 MG
1000 TABLET ORAL ONCE
Status: COMPLETED | OUTPATIENT
Start: 2021-08-13 | End: 2021-08-13

## 2021-08-13 RX ORDER — BETAMETHASONE SODIUM PHOSPHATE AND BETAMETHASONE ACETATE 3; 3 MG/ML; MG/ML
12 INJECTION, SUSPENSION INTRA-ARTICULAR; INTRALESIONAL; INTRAMUSCULAR; SOFT TISSUE EVERY 24 HOURS
Status: DISCONTINUED | OUTPATIENT
Start: 2021-08-13 | End: 2021-08-13 | Stop reason: HOSPADM

## 2021-08-13 RX ADMIN — ACETAMINOPHEN 1000 MG: 500 TABLET ORAL at 11:25

## 2021-08-13 RX ADMIN — BETAMETHASONE ACETATE AND BETAMETHASONE SODIUM PHOSPHATE 12 MG: 3; 3 INJECTION, SUSPENSION INTRA-ARTICULAR; INTRALESIONAL; INTRAMUSCULAR; SOFT TISSUE at 12:10

## 2021-08-13 NOTE — SIGNIFICANT NOTE
08/13/21 1143   Nonstress Test   Reason for NST Hypertension   Acoustic Stimulator No   Uterine Irritability No   Contractions Not present   Fetal Assessment   Fetal Movement active   Fetal HR Assessment Method external   Fetal HR (beats/min) 145   Fetal Heart Baseline Rate normal range   Fetal HR Variability moderate (amplitude range 6 to 25 bpm)   Fetal HR Accelerations greater than/equal to 15 bpm;lasting at least 15 seconds   Fetal HR Decelerations absent   Fetal HR Tracing Category Category I   Sinusoidal Pattern Present absent

## 2021-08-13 NOTE — PROGRESS NOTES
Obstetrical Non-stress Test Interpretation     Name:  Ana Brito  MRN: [id number]    26 y.o. female  at 35w4d    Indication: Chronic hypertension  Weeks Gestation: 35w4d     Patient sent from the office due to hypertension.  Patient has chronic hypertension.  Patient now has mild hypertension with blood pressures in the 130s over 90s.  PC ratio was 0.20 2 days ago  AST ALT platelets normal today.  Expect discharge home after PC ratio returns normal.  Addendum: PC ratio 0.1.  Dr. Grace request follow-up NST again on Monday.      Baseline: 140 BPM  Variability:   Moderate/Normal (amplitude 6-25 bpm)  Accelerations: Present (32 weeks+) 15 x 15 bpm  Decelerations: Absent   Contractions:  Absent      Impression:  Reactive NST, known chronic hypertension.  Now with mild hypertension  No induction until 37 weeks with current vitals.    Plan: Discharge to home.  Keep follow up per office instructions.      Mahamed Brock Sr., MD  2021  11:28 EDT

## 2021-08-14 ENCOUNTER — HOSPITAL ENCOUNTER (OUTPATIENT)
Dept: LABOR AND DELIVERY | Facility: HOSPITAL | Age: 26
Discharge: HOME | End: 2021-08-14

## 2021-08-14 ENCOUNTER — HOSPITAL ENCOUNTER (OUTPATIENT)
Facility: HOSPITAL | Age: 26
Discharge: HOME | End: 2021-08-14
Attending: OBSTETRICS & GYNECOLOGY | Admitting: OBSTETRICS & GYNECOLOGY

## 2021-08-14 PROCEDURE — G0463 HOSPITAL OUTPT CLINIC VISIT: HCPCS

## 2021-08-14 PROCEDURE — 25010000002 BETAMETHASONE ACET & SOD PHOS PER 4 MG: Performed by: OBSTETRICS & GYNECOLOGY

## 2021-08-14 PROCEDURE — 96372 THER/PROPH/DIAG INJ SC/IM: CPT

## 2021-08-14 RX ORDER — BETAMETHASONE SODIUM PHOSPHATE AND BETAMETHASONE ACETATE 3; 3 MG/ML; MG/ML
12 INJECTION, SUSPENSION INTRA-ARTICULAR; INTRALESIONAL; INTRAMUSCULAR; SOFT TISSUE EVERY 24 HOURS
Status: DISCONTINUED | OUTPATIENT
Start: 2021-08-14 | End: 2021-08-14 | Stop reason: HOSPADM

## 2021-08-14 RX ORDER — BETAMETHASONE SODIUM PHOSPHATE AND BETAMETHASONE ACETATE 3; 3 MG/ML; MG/ML
12 INJECTION, SUSPENSION INTRA-ARTICULAR; INTRALESIONAL; INTRAMUSCULAR; SOFT TISSUE EVERY 24 HOURS
Status: DISCONTINUED | OUTPATIENT
Start: 2021-08-14 | End: 2021-08-16 | Stop reason: HOSPADM

## 2021-08-14 RX ORDER — BETAMETHASONE SODIUM PHOSPHATE AND BETAMETHASONE ACETATE 3; 3 MG/ML; MG/ML
INJECTION, SUSPENSION INTRA-ARTICULAR; INTRALESIONAL; INTRAMUSCULAR; SOFT TISSUE
Status: DISCONTINUED
Start: 2021-08-14 | End: 2021-08-14 | Stop reason: HOSPADM

## 2021-08-14 RX ADMIN — BETAMETHASONE SODIUM PHOSPHATE AND BETAMETHASONE ACETATE 12 MG: 3; 3 INJECTION, SUSPENSION INTRA-ARTICULAR; INTRALESIONAL; INTRAMUSCULAR at 13:35

## 2021-08-14 NOTE — SIGNIFICANT NOTE
08/14/21 1111   Nonstress Test   Reason for NST OB Triage;Hypertension  (gestational hypertension)   Acoustic Stimulator No   Uterine Irritability No   Contractions Not present   Fetal Assessment   Fetal Movement active   Fetal HR Assessment Method external   Fetal Heart Baseline Rate normal range   Fetal HR Variability moderate (amplitude range 6 to 25 bpm)   Fetal HR Accelerations episodic   Fetal HR Decelerations absent   Fetal HR Tracing Category Category I   Interpretation A   Nonstress Test Interpretation A Reactive

## 2021-08-16 ENCOUNTER — HOSPITAL ENCOUNTER (OUTPATIENT)
Facility: HOSPITAL | Age: 26
Discharge: HOME | End: 2021-08-16
Attending: OBSTETRICS & GYNECOLOGY | Admitting: OBSTETRICS & GYNECOLOGY

## 2021-08-16 ENCOUNTER — HOSPITAL ENCOUNTER (OUTPATIENT)
Dept: LABOR AND DELIVERY | Facility: HOSPITAL | Age: 26
Discharge: HOME | End: 2021-08-16

## 2021-08-16 VITALS
HEART RATE: 82 BPM | SYSTOLIC BLOOD PRESSURE: 124 MMHG | TEMPERATURE: 98.2 F | DIASTOLIC BLOOD PRESSURE: 81 MMHG | OXYGEN SATURATION: 98 % | RESPIRATION RATE: 16 BRPM

## 2021-08-16 PROCEDURE — 59025 FETAL NON-STRESS TEST: CPT

## 2021-08-16 PROCEDURE — G0463 HOSPITAL OUTPT CLINIC VISIT: HCPCS

## 2021-08-16 NOTE — NON STRESS TEST
OB SCHEDULED NST NOTE        Name:  Ana Brito  MRN: [id number]    26 y.o. female  at 36w0d    Indication: CHTN    NST:  Baseline: Normal 110-160 bpm  Variability:   Moderate/Normal (amplitude 6-25 bpm)  Accelerations: Present (32 weeks+) 15 x 15 bpm  Decelerations: None  Contractions:  Irritability    Impression:  Category I , NL BP TODAY    Plan: OB Precautions, HTN Precautions, FKC, Keep scheduled NSTs, Keep office visit, Keep scheduled IOL      Electronically signed by Cristal Grace DO, 21, 10:57 AM EDT.

## 2021-08-16 NOTE — SIGNIFICANT NOTE
08/16/21 0935   Nonstress Test   Reason for NST Other (Comment)  (nst for chtn)   Acoustic Stimulator No   Uterine Irritability No   Contractions Irregular   Fetal Assessment   Fetal Movement active   Fetal HR Assessment Method external   Fetal HR (beats/min) 135   Fetal Heart Baseline Rate normal range   Fetal HR Variability moderate (amplitude range 6 to 25 bpm)   Fetal HR Accelerations greater than/equal to 15 bpm;lasting at least 15 seconds   Fetal HR Decelerations absent   Sinusoidal Pattern Present absent

## 2021-08-18 ENCOUNTER — HOSPITAL ENCOUNTER (OUTPATIENT)
Facility: HOSPITAL | Age: 26
Discharge: HOME | End: 2021-08-18
Attending: ADVANCED PRACTICE MIDWIFE | Admitting: ADVANCED PRACTICE MIDWIFE

## 2021-08-18 VITALS
HEART RATE: 102 BPM | TEMPERATURE: 98.6 F | DIASTOLIC BLOOD PRESSURE: 74 MMHG | RESPIRATION RATE: 18 BRPM | SYSTOLIC BLOOD PRESSURE: 112 MMHG

## 2021-08-18 LAB
ALBUMIN SERPL-MCNC: 3.6 G/DL (ref 3.5–5.2)
ALBUMIN/GLOB SERPL: 1.4 G/DL
ALP SERPL-CCNC: 119 U/L (ref 39–117)
ALT SERPL W P-5'-P-CCNC: 8 U/L (ref 1–33)
ANION GAP SERPL CALCULATED.3IONS-SCNC: 12.4 MMOL/L (ref 5–15)
AST SERPL-CCNC: 15 U/L (ref 1–32)
BILIRUB SERPL-MCNC: <0.2 MG/DL (ref 0–1.2)
BUN SERPL-MCNC: 8 MG/DL (ref 6–20)
BUN/CREAT SERPL: 13.1 (ref 7–25)
CALCIUM SPEC-SCNC: 9.2 MG/DL (ref 8.6–10.5)
CHLORIDE SERPL-SCNC: 101 MMOL/L (ref 98–107)
CO2 SERPL-SCNC: 20.6 MMOL/L (ref 22–29)
CREAT SERPL-MCNC: 0.61 MG/DL (ref 0.57–1)
CREAT UR-MCNC: 142.1 MG/DL
DEPRECATED RDW RBC AUTO: 44.9 FL (ref 37–54)
ERYTHROCYTE [DISTWIDTH] IN BLOOD BY AUTOMATED COUNT: 13.9 % (ref 12.3–15.4)
GFR SERPL CREATININE-BSD FRML MDRD: 120 ML/MIN/1.73
GLOBULIN UR ELPH-MCNC: 2.6 GM/DL
GLUCOSE SERPL-MCNC: 81 MG/DL (ref 65–99)
HCT VFR BLD AUTO: 33.7 % (ref 34–46.6)
HGB BLD-MCNC: 12 G/DL (ref 12–15.9)
MCH RBC QN AUTO: 31.7 PG (ref 26.6–33)
MCHC RBC AUTO-ENTMCNC: 35.6 G/DL (ref 31.5–35.7)
MCV RBC AUTO: 88.9 FL (ref 79–97)
PLATELET # BLD AUTO: 168 10*3/MM3 (ref 140–450)
PMV BLD AUTO: 11.6 FL (ref 6–12)
POTASSIUM SERPL-SCNC: 3.8 MMOL/L (ref 3.5–5.2)
PROT SERPL-MCNC: 6.2 G/DL (ref 6–8.5)
PROT UR-MCNC: 18.6 MG/DL
PROT/CREAT UR: 0.1 MG/G{CREAT}
RBC # BLD AUTO: 3.79 10*6/MM3 (ref 3.77–5.28)
SODIUM SERPL-SCNC: 134 MMOL/L (ref 136–145)
WBC # BLD AUTO: 8.5 10*3/MM3 (ref 3.4–10.8)

## 2021-08-18 PROCEDURE — 85027 COMPLETE CBC AUTOMATED: CPT | Performed by: ADVANCED PRACTICE MIDWIFE

## 2021-08-18 PROCEDURE — 80053 COMPREHEN METABOLIC PANEL: CPT | Performed by: ADVANCED PRACTICE MIDWIFE

## 2021-08-18 PROCEDURE — 84156 ASSAY OF PROTEIN URINE: CPT | Performed by: ADVANCED PRACTICE MIDWIFE

## 2021-08-18 PROCEDURE — 59025 FETAL NON-STRESS TEST: CPT

## 2021-08-18 PROCEDURE — 82570 ASSAY OF URINE CREATININE: CPT | Performed by: ADVANCED PRACTICE MIDWIFE

## 2021-08-18 NOTE — PROGRESS NOTES
[facility]  Triage Progress Note    Chief Complaint   Patient presents with   • Non-stress Test     Chronic HTN       Subjective     Patient is a 26 y.o. female  currently at 36w2d, who presents for scheduled NST for CHTN. Patient states had headache this am that improved with Tylenol.       Past OB History:     OB History    Para Term  AB Living   2 1 0 0 0 1   SAB TAB Ectopic Molar Multiple Live Births   0 0 0 0 0 0      # Outcome Date GA Lbr Will/2nd Weight Sex Delivery Anes PTL Lv   2 Current            1 Para                 Objective       Vital Signs Range for the last 24 hours  Temperature: Temp:  [98.6 °F (37 °C)] 98.6 °F (37 °C)   Temp Source: Temp src: Oral   BP: BP: (110-144)/(72-98) 112/74   Pulse: Heart Rate:  [] 102   Respirations: Resp:  [18] 18   SPO2:     O2 Amount (l/min):     O2 Devices     Weight:           Fetal Heart Rate Assessment   Method: Fetal HR Assessment Method: external   Beats/min: Fetal HR (beats/min): 135   Baseline: Fetal Heart Baseline Rate: normal range   Variability: Fetal HR Variability: moderate (amplitude range 6 to 25 bpm)   Accels: Fetal HR Accelerations: greater than/equal to 15 bpm, lasting at least 15 seconds   Decels: Fetal HR Decelerations: absent   Tracing Category: Fetal HR Tracing Category: Category I     Uterine Assessment   Method: Method: external tocotransducer   Frequency (min):     Ctx Count in 10 min:     Duration:     Intensity: Contraction Intensity: no contractions   Intensity by IUPC:     Resting Tone: Uterine Resting Tone: soft by palpation   Resting Tone by IUPC:     [place] Units:       Laboratory Results:    Results for orders placed or performed during the hospital encounter of 21   CBC (No Diff)    Specimen: Blood   Result Value Ref Range    WBC 8.50 3.40 - 10.80 10*3/mm3    RBC 3.79 3.77 - 5.28 10*6/mm3    Hemoglobin 12.0 12.0 - 15.9 g/dL    Hematocrit 33.7 (L) 34.0 - 46.6 %    MCV 88.9 79.0 - 97.0 fL    MCH 31.7  26.6 - 33.0 pg    MCHC 35.6 31.5 - 35.7 g/dL    RDW 13.9 12.3 - 15.4 %    RDW-SD 44.9 37.0 - 54.0 fl    MPV 11.6 6.0 - 12.0 fL    Platelets 168 140 - 450 10*3/mm3   Comprehensive Metabolic Panel    Specimen: Blood   Result Value Ref Range    Glucose 81 65 - 99 mg/dL    BUN 8 6 - 20 mg/dL    Creatinine 0.61 0.57 - 1.00 mg/dL    Sodium 134 (L) 136 - 145 mmol/L    Potassium 3.8 3.5 - 5.2 mmol/L    Chloride 101 98 - 107 mmol/L    CO2 20.6 (L) 22.0 - 29.0 mmol/L    Calcium 9.2 8.6 - 10.5 mg/dL    Total Protein 6.2 6.0 - 8.5 g/dL    Albumin 3.60 3.50 - 5.20 g/dL    ALT (SGPT) 8 1 - 33 U/L    AST (SGOT) 15 1 - 32 U/L    Alkaline Phosphatase 119 (H) 39 - 117 U/L    Total Bilirubin <0.2 0.0 - 1.2 mg/dL    eGFR Non African Amer 120 >60 mL/min/1.73    Globulin 2.6 gm/dL    A/G Ratio 1.4 g/dL    BUN/Creatinine Ratio 13.1 7.0 - 25.0    Anion Gap 12.4 5.0 - 15.0 mmol/L   Protein / Creatinine Ratio, Urine - Urine, Clean Catch    Specimen: Urine, Clean Catch   Result Value Ref Range    Creatinine, Urine 142.1 mg/dL    Total Protein, Urine 18.6 mg/dL    Protein/Creatinine Ratio, Urine 0.1           Assessment/Plan       Chronic Hypertension      Plan:  1. Discharge to home.  Patient discharged home with mild hypertension and labs WNL. No indication to induce at this gestation. Instructed to keep appointment in the office on Friday and to have repeat NST on Saturday (patient to call back to schedule).  2. Plan of care has been reviewed with patient  3.  Risks, benefits of treatment plan have been discussed.  4.  All questions have been answered.        Monet Romo CNM  8/18/2021  16:12 EDT

## 2021-08-18 NOTE — SIGNIFICANT NOTE
08/18/21 1230   Nonstress Test   Reason for NST Hypertension   Acoustic Stimulator No   Uterine Irritability Yes   Contractions Irregular   Fetal Assessment   Fetal Movement active   Fetal HR Assessment Method external   Fetal HR (beats/min) 135   Fetal Heart Baseline Rate normal range   Fetal HR Variability moderate (amplitude range 6 to 25 bpm)   Fetal HR Accelerations greater than/equal to 15 bpm;lasting at least 15 seconds   Fetal HR Decelerations absent   Fetal HR Tracing Category Category I   Interpretation A   Nonstress Test Interpretation A Reactive   36w2d /74   Pulse 102   Temp 98.6 °F (37 °C) (Oral)   Resp 18   LMP 11/05/2020

## 2021-08-20 ENCOUNTER — TRANSCRIBE ORDERS (OUTPATIENT)
Dept: LAB | Facility: HOSPITAL | Age: 26
End: 2021-08-20

## 2021-08-20 ENCOUNTER — LAB (OUTPATIENT)
Dept: LAB | Facility: HOSPITAL | Age: 26
End: 2021-08-20

## 2021-08-20 DIAGNOSIS — Z01.818 PREOP TESTING: ICD-10-CM

## 2021-08-20 DIAGNOSIS — Z01.818 PREOP TESTING: Primary | ICD-10-CM

## 2021-08-20 PROCEDURE — U0003 INFECTIOUS AGENT DETECTION BY NUCLEIC ACID (DNA OR RNA); SEVERE ACUTE RESPIRATORY SYNDROME CORONAVIRUS 2 (SARS-COV-2) (CORONAVIRUS DISEASE [COVID-19]), AMPLIFIED PROBE TECHNIQUE, MAKING USE OF HIGH THROUGHPUT TECHNOLOGIES AS DESCRIBED BY CMS-2020-01-R: HCPCS

## 2021-08-23 ENCOUNTER — HOSPITAL ENCOUNTER (OUTPATIENT)
Dept: LABOR AND DELIVERY | Facility: HOSPITAL | Age: 26
Discharge: HOME | End: 2021-08-23

## 2021-08-23 ENCOUNTER — PREP FOR SURGERY (OUTPATIENT)
Dept: OTHER | Facility: HOSPITAL | Age: 26
End: 2021-08-23

## 2021-08-23 ENCOUNTER — HOSPITAL ENCOUNTER (OUTPATIENT)
Facility: HOSPITAL | Age: 26
Discharge: HOME | End: 2021-08-23
Attending: OBSTETRICS & GYNECOLOGY | Admitting: OBSTETRICS & GYNECOLOGY

## 2021-08-23 VITALS — HEART RATE: 101 BPM | SYSTOLIC BLOOD PRESSURE: 116 MMHG | DIASTOLIC BLOOD PRESSURE: 83 MMHG

## 2021-08-23 PROBLEM — Z34.90 ENCOUNTER FOR INDUCTION OF LABOR: Status: ACTIVE | Noted: 2021-08-23

## 2021-08-23 PROBLEM — Z86.711 HX OF PULMONARY EMBOLUS: Status: ACTIVE | Noted: 2021-08-23

## 2021-08-23 PROBLEM — O10.919 CHRONIC HYPERTENSION AFFECTING PREGNANCY: Status: ACTIVE | Noted: 2021-08-23

## 2021-08-23 LAB — SARS-COV-2 RNA RESP QL NAA+PROBE: DETECTED

## 2021-08-23 PROCEDURE — G0463 HOSPITAL OUTPT CLINIC VISIT: HCPCS

## 2021-08-23 PROCEDURE — 59025 FETAL NON-STRESS TEST: CPT

## 2021-08-23 RX ORDER — HEPARIN SODIUM 10000 [USP'U]/ML
10000 INJECTION, SOLUTION INTRAVENOUS; SUBCUTANEOUS 2 TIMES DAILY
Status: ON HOLD | COMMUNITY
End: 2021-08-26

## 2021-08-23 RX ORDER — SODIUM CHLORIDE 0.9 % (FLUSH) 0.9 %
10 SYRINGE (ML) INJECTION AS NEEDED
Status: CANCELLED | OUTPATIENT
Start: 2021-08-23

## 2021-08-23 RX ORDER — MAGNESIUM CARB/ALUMINUM HYDROX 105-160MG
30 TABLET,CHEWABLE ORAL ONCE
Status: CANCELLED | OUTPATIENT
Start: 2021-08-23 | End: 2021-08-23

## 2021-08-23 RX ORDER — SODIUM CHLORIDE, SODIUM LACTATE, POTASSIUM CHLORIDE, CALCIUM CHLORIDE 600; 310; 30; 20 MG/100ML; MG/100ML; MG/100ML; MG/100ML
150 INJECTION, SOLUTION INTRAVENOUS CONTINUOUS
Status: CANCELLED | OUTPATIENT
Start: 2021-08-23

## 2021-08-23 RX ORDER — FAMOTIDINE 20 MG/1
20 TABLET, FILM COATED ORAL 2 TIMES DAILY PRN
Status: CANCELLED | OUTPATIENT
Start: 2021-08-23

## 2021-08-23 RX ORDER — HYDROCODONE BITARTRATE AND ACETAMINOPHEN 10; 325 MG/1; MG/1
1 TABLET ORAL EVERY 4 HOURS PRN
Status: CANCELLED | OUTPATIENT
Start: 2021-08-23 | End: 2021-08-30

## 2021-08-23 RX ORDER — MORPHINE SULFATE 5 MG/ML
5 INJECTION, SOLUTION INTRAMUSCULAR; INTRAVENOUS
Status: CANCELLED | OUTPATIENT
Start: 2021-08-23

## 2021-08-23 RX ORDER — PROMETHAZINE HYDROCHLORIDE 12.5 MG/1
12.5 TABLET ORAL EVERY 6 HOURS PRN
Status: CANCELLED | OUTPATIENT
Start: 2021-08-23

## 2021-08-23 RX ORDER — HYDROCODONE BITARTRATE AND ACETAMINOPHEN 5; 325 MG/1; MG/1
1 TABLET ORAL EVERY 4 HOURS PRN
Status: CANCELLED | OUTPATIENT
Start: 2021-08-23 | End: 2021-08-30

## 2021-08-23 RX ORDER — MISOPROSTOL 200 UG/1
800 TABLET ORAL AS NEEDED
Status: CANCELLED | OUTPATIENT
Start: 2021-08-23

## 2021-08-23 RX ORDER — FAMOTIDINE 20 MG/1
20 TABLET, FILM COATED ORAL ONCE AS NEEDED
Status: CANCELLED | OUTPATIENT
Start: 2021-08-23

## 2021-08-23 RX ORDER — ONDANSETRON 4 MG/1
4 TABLET, FILM COATED ORAL EVERY 6 HOURS PRN
Status: CANCELLED | OUTPATIENT
Start: 2021-08-23

## 2021-08-23 RX ORDER — TRISODIUM CITRATE DIHYDRATE AND CITRIC ACID MONOHYDRATE 500; 334 MG/5ML; MG/5ML
30 SOLUTION ORAL ONCE AS NEEDED
Status: CANCELLED | OUTPATIENT
Start: 2021-08-23

## 2021-08-23 RX ORDER — FAMOTIDINE 10 MG/ML
20 INJECTION, SOLUTION INTRAVENOUS 2 TIMES DAILY PRN
Status: CANCELLED | OUTPATIENT
Start: 2021-08-23

## 2021-08-23 RX ORDER — IBUPROFEN 800 MG/1
800 TABLET ORAL EVERY 8 HOURS SCHEDULED
Status: CANCELLED | OUTPATIENT
Start: 2021-08-23

## 2021-08-23 RX ORDER — CARBOPROST TROMETHAMINE 250 UG/ML
250 INJECTION, SOLUTION INTRAMUSCULAR ONCE AS NEEDED
Status: CANCELLED | OUTPATIENT
Start: 2021-08-23

## 2021-08-23 RX ORDER — TERBUTALINE SULFATE 1 MG/ML
0.25 INJECTION, SOLUTION SUBCUTANEOUS AS NEEDED
Status: CANCELLED | OUTPATIENT
Start: 2021-08-23

## 2021-08-23 RX ORDER — FAMOTIDINE 10 MG/ML
20 INJECTION, SOLUTION INTRAVENOUS ONCE AS NEEDED
Status: CANCELLED | OUTPATIENT
Start: 2021-08-23

## 2021-08-23 RX ORDER — LIDOCAINE HYDROCHLORIDE 10 MG/ML
5 INJECTION, SOLUTION EPIDURAL; INFILTRATION; INTRACAUDAL; PERINEURAL AS NEEDED
Status: CANCELLED | OUTPATIENT
Start: 2021-08-23

## 2021-08-23 RX ORDER — METOCLOPRAMIDE HYDROCHLORIDE 5 MG/ML
10 INJECTION INTRAMUSCULAR; INTRAVENOUS ONCE AS NEEDED
Status: CANCELLED | OUTPATIENT
Start: 2021-08-23

## 2021-08-23 RX ORDER — ACETAMINOPHEN 325 MG/1
650 TABLET ORAL EVERY 4 HOURS PRN
Status: CANCELLED | OUTPATIENT
Start: 2021-08-23

## 2021-08-23 RX ORDER — CEFAZOLIN SODIUM 2 G/100ML
2 INJECTION, SOLUTION INTRAVENOUS ONCE AS NEEDED
Status: CANCELLED | OUTPATIENT
Start: 2021-08-23 | End: 2021-08-27

## 2021-08-23 RX ORDER — PROMETHAZINE HYDROCHLORIDE 25 MG/1
25 TABLET ORAL EVERY 6 HOURS PRN
Status: CANCELLED | OUTPATIENT
Start: 2021-08-23

## 2021-08-23 RX ORDER — ONDANSETRON 2 MG/ML
4 INJECTION INTRAMUSCULAR; INTRAVENOUS EVERY 6 HOURS PRN
Status: CANCELLED | OUTPATIENT
Start: 2021-08-23

## 2021-08-23 RX ORDER — ACETAMINOPHEN 325 MG/1
650 TABLET ORAL EVERY 6 HOURS
Status: CANCELLED | OUTPATIENT
Start: 2021-08-23

## 2021-08-23 RX ORDER — SODIUM CHLORIDE 0.9 % (FLUSH) 0.9 %
3 SYRINGE (ML) INJECTION EVERY 12 HOURS SCHEDULED
Status: CANCELLED | OUTPATIENT
Start: 2021-08-23

## 2021-08-23 RX ORDER — OXYTOCIN-SODIUM CHLORIDE 0.9% IV SOLN 30 UNIT/500ML 30-0.9/5 UT/ML-%
125 SOLUTION INTRAVENOUS ONCE
Status: CANCELLED | OUTPATIENT
Start: 2021-08-23 | End: 2021-08-23

## 2021-08-23 RX ORDER — OXYTOCIN-SODIUM CHLORIDE 0.9% IV SOLN 30 UNIT/500ML 30-0.9/5 UT/ML-%
1-4 SOLUTION INTRAVENOUS
Status: CANCELLED | OUTPATIENT
Start: 2021-08-23

## 2021-08-23 NOTE — H&P
OB HISTORY AND PHYSICAL      SUBJECTIVE:    26 y.o. female  currently at 37w2d PNC complicated by:  CHTN and Hx of PE on heparin.  +COVID on IOL screen.    CC/HPI:  Presents with Scheduled IOL.  Over last several weeks pts Bps have been rising.     ROS: No leaking fluid, No vaginal bleeding, No contractions, Adequate FM and mild URI sxs    Past OB History: 2017  37weesk GHTN- BP persisted elevated PP, 6#6oz    Prenatal Labs:  Reviewed, see PNR, labs of note: Oneg, RI, 1hr 106, GBS neg    PMHx:    Past Medical History:   Diagnosis Date   • Anxiety    • Depression    • Hypertension     CHRONIC NO MEDS   • Pulmonary embolism (CMS/HCC)     WHILE USING NUVARING.  THROMBOPHILIA PANEL NEG AND NO FHX ARBEN.       Home Medications:  Ferrous sulfate, heparin 10,000 SQ BID, and prenatal vitamins, vit D, Mag OTC    Allergies:  No Known Allergies    PSHx:    Past Surgical History:   Procedure Laterality Date   • KIDNEY STONE SURGERY  2021    w ureteral stent placement and removal   • MULTIPLE TOOTH EXTRACTIONS     • WISDOM TOOTH EXTRACTION           Social History:    reports that she has never smoked. She has never used smokeless tobacco. She reports previous alcohol use. She reports previous drug use.      Family History: Non contributory    Immunizations: See prenatal record for Tdap, Flu, Covid and/or other vaccinations    PHYSICAL EXAM:    Vitals: Reviewed       General- NAD, alert and oriented, appropriate  Psych- normal mood, good memory  CV- Regular rhythm, no murnurs  Resp- CTA to bases, no wheezes  Abdomen- Gravid, non tender  Fundus-  Non tender. Size: consistent with dates, EFW- 8 lbs, US - 7#11oz.   Pelvis-  Adequate   Cvx- 1cm / Not effaced / -3  Presentation- VTX  Ext/DTR: Trace edema, bilaterally equal, no signs of DVT    Fetal HR: Doptones 150  Contractions: Not yany    Lab/Imaging/Other: see EPIC, reviewed if available       ASSESSMENT:  37w2d  Scheduled IOL  CHTN w increasing Bps  Hx of  PE on heparin  +COVID on IOl screen  GBS: Negative    PLAN:  Admit  Delivery: IOL, pitocin and cervical catheter    Plan of care NLT hospital course, R/B/A/potential SE, suspected length 24-48+hrs have been reviewed with patient and any family or friends present, questions answered to her/his/their satisfaction.  Pt desires to proceed as above.    Counseling:The patient was counseled on the risks, benefits and alternatives of Induction.  Risks reviewed, but are not limited to: bleeding, transfusion, fetal intolerance,  (possibly emergent),  and uterine rupture.  She declines expectant management or  and desires induction.  All her questions have been answered to her satisfaction and she desires to proceed.        Electronically signed by Cristal Grace DO, 21, 5:48 PM EDT.

## 2021-08-23 NOTE — SIGNIFICANT NOTE
08/23/21 1838   Nonstress Test   Reason for NST OB Triage;Hypertension;Other (Comment)  (chtn, +covid)   Acoustic Stimulator No   Uterine Irritability No   Contractions Not present   Fetal Assessment   Fetal Movement active   Fetal HR Assessment Method external   Fetal HR (beats/min) 150  (150's)   Fetal Heart Baseline Rate normal range   Fetal HR Variability moderate (amplitude range 6 to 25 bpm)   Fetal HR Decelerations absent   Fetal HR Tracing Category Category I   Sinusoidal Pattern Present absent   Interpretation A   Nonstress Test Interpretation A Reactive

## 2021-08-23 NOTE — SIGNIFICANT NOTE
08/23/21 1655   Nonstress Test   Reason for NST Hypertension   Acoustic Stimulator Yes  (ONCE)   Uterine Irritability No   Contractions Not present   Fetal Assessment   Fetal Movement active   Fetal HR Assessment Method external   Fetal HR (beats/min) 150   Fetal Heart Baseline Rate normal range   Fetal HR Variability moderate (amplitude range 6 to 25 bpm)   Fetal HR Accelerations episodic;periodic;greater than/equal to 15 bpm;lasting at least 15 seconds   Fetal HR Decelerations absent   Fetal HR Tracing Category Category I   Sinusoidal Pattern Present absent   Interpretation A   Nonstress Test Interpretation A Reactive   /83   Pulse 101   LMP 11/05/2020    37w0d

## 2021-08-25 ENCOUNTER — HOSPITAL ENCOUNTER (OUTPATIENT)
Dept: LABOR AND DELIVERY | Facility: HOSPITAL | Age: 26
Discharge: HOME | End: 2021-08-25

## 2021-08-25 ENCOUNTER — ANESTHESIA EVENT (OUTPATIENT)
Dept: LABOR AND DELIVERY | Facility: HOSPITAL | Age: 26
End: 2021-08-25

## 2021-08-25 ENCOUNTER — ANESTHESIA (OUTPATIENT)
Dept: LABOR AND DELIVERY | Facility: HOSPITAL | Age: 26
End: 2021-08-25

## 2021-08-25 ENCOUNTER — HOSPITAL ENCOUNTER (INPATIENT)
Facility: HOSPITAL | Age: 26
LOS: 2 days | Discharge: HOME | End: 2021-08-27
Attending: OBSTETRICS & GYNECOLOGY | Admitting: OBSTETRICS & GYNECOLOGY

## 2021-08-25 LAB
ABO GROUP BLD: NORMAL
ALBUMIN SERPL-MCNC: 3.8 G/DL (ref 3.5–5.2)
ALBUMIN/GLOB SERPL: 1.5 G/DL
ALP SERPL-CCNC: 157 U/L (ref 39–117)
ALT SERPL W P-5'-P-CCNC: 14 U/L (ref 1–33)
ANION GAP SERPL CALCULATED.3IONS-SCNC: 13.7 MMOL/L (ref 5–15)
APTT PPP: 31.5 SECONDS (ref 22.2–34.2)
AST SERPL-CCNC: 20 U/L (ref 1–32)
BILIRUB SERPL-MCNC: 0.2 MG/DL (ref 0–1.2)
BLD GP AB SCN SERPL QL: NEGATIVE
BUN SERPL-MCNC: 7 MG/DL (ref 6–20)
BUN/CREAT SERPL: 10.1 (ref 7–25)
CALCIUM SPEC-SCNC: 8.9 MG/DL (ref 8.6–10.5)
CHLORIDE SERPL-SCNC: 104 MMOL/L (ref 98–107)
CO2 SERPL-SCNC: 19.3 MMOL/L (ref 22–29)
CREAT SERPL-MCNC: 0.69 MG/DL (ref 0.57–1)
CREAT UR-MCNC: 227.1 MG/DL
DEPRECATED RDW RBC AUTO: 44 FL (ref 37–54)
ERYTHROCYTE [DISTWIDTH] IN BLOOD BY AUTOMATED COUNT: 13.7 % (ref 12.3–15.4)
GFR SERPL CREATININE-BSD FRML MDRD: 104 ML/MIN/1.73
GLOBULIN UR ELPH-MCNC: 2.6 GM/DL
GLUCOSE SERPL-MCNC: 82 MG/DL (ref 65–99)
HCT VFR BLD AUTO: 37.5 % (ref 34–46.6)
HGB BLD-MCNC: 12.9 G/DL (ref 12–15.9)
INR PPP: 0.82 (ref 2–3)
MCH RBC QN AUTO: 30.6 PG (ref 26.6–33)
MCHC RBC AUTO-ENTMCNC: 34.4 G/DL (ref 31.5–35.7)
MCV RBC AUTO: 89.1 FL (ref 79–97)
PLATELET # BLD AUTO: 137 10*3/MM3 (ref 140–450)
PMV BLD AUTO: 11.1 FL (ref 6–12)
POTASSIUM SERPL-SCNC: 4.2 MMOL/L (ref 3.5–5.2)
PROT SERPL-MCNC: 6.4 G/DL (ref 6–8.5)
PROT UR-MCNC: 35.4 MG/DL
PROT/CREAT UR: 0.2 MG/G{CREAT}
PROTHROMBIN TIME: 9.4 SECONDS (ref 9.4–12)
RBC # BLD AUTO: 4.21 10*6/MM3 (ref 3.77–5.28)
RH BLD: NEGATIVE
SODIUM SERPL-SCNC: 137 MMOL/L (ref 136–145)
T&S EXPIRATION DATE: NORMAL
WBC # BLD AUTO: 6.78 10*3/MM3 (ref 3.4–10.8)

## 2021-08-25 PROCEDURE — 86850 RBC ANTIBODY SCREEN: CPT | Performed by: OBSTETRICS & GYNECOLOGY

## 2021-08-25 PROCEDURE — 82570 ASSAY OF URINE CREATININE: CPT | Performed by: OBSTETRICS & GYNECOLOGY

## 2021-08-25 PROCEDURE — 85730 THROMBOPLASTIN TIME PARTIAL: CPT | Performed by: OBSTETRICS & GYNECOLOGY

## 2021-08-25 PROCEDURE — C1755 CATHETER, INTRASPINAL: HCPCS | Performed by: ANESTHESIOLOGY

## 2021-08-25 PROCEDURE — 25010000002 ROPIVACAINE PER 1 MG: Performed by: ANESTHESIOLOGY

## 2021-08-25 PROCEDURE — 25010000002 FENTANYL CITRATE (PF) 1000 MCG/20ML SOLUTION: Performed by: ANESTHESIOLOGY

## 2021-08-25 PROCEDURE — 25010000002 FENTANYL CITRATE (PF) 50 MCG/ML SOLUTION: Performed by: ANESTHESIOLOGY

## 2021-08-25 PROCEDURE — 85610 PROTHROMBIN TIME: CPT | Performed by: OBSTETRICS & GYNECOLOGY

## 2021-08-25 PROCEDURE — 25010000002 ROPIVACAINE PER 1 MG

## 2021-08-25 PROCEDURE — 84156 ASSAY OF PROTEIN URINE: CPT | Performed by: OBSTETRICS & GYNECOLOGY

## 2021-08-25 PROCEDURE — 80053 COMPREHEN METABOLIC PANEL: CPT | Performed by: OBSTETRICS & GYNECOLOGY

## 2021-08-25 PROCEDURE — 86901 BLOOD TYPING SEROLOGIC RH(D): CPT | Performed by: OBSTETRICS & GYNECOLOGY

## 2021-08-25 PROCEDURE — 86900 BLOOD TYPING SEROLOGIC ABO: CPT | Performed by: OBSTETRICS & GYNECOLOGY

## 2021-08-25 PROCEDURE — 85027 COMPLETE CBC AUTOMATED: CPT | Performed by: OBSTETRICS & GYNECOLOGY

## 2021-08-25 PROCEDURE — 25010000002 FENTANYL CITRATE (PF) 50 MCG/ML SOLUTION

## 2021-08-25 RX ORDER — FAMOTIDINE 10 MG/ML
20 INJECTION, SOLUTION INTRAVENOUS 2 TIMES DAILY PRN
Status: DISCONTINUED | OUTPATIENT
Start: 2021-08-25 | End: 2021-08-26 | Stop reason: HOSPADM

## 2021-08-25 RX ORDER — EPHEDRINE SULFATE 50 MG/ML
5 INJECTION, SOLUTION INTRAVENOUS
Status: DISCONTINUED | OUTPATIENT
Start: 2021-08-25 | End: 2021-08-26 | Stop reason: HOSPADM

## 2021-08-25 RX ORDER — FAMOTIDINE 20 MG/1
20 TABLET, FILM COATED ORAL 2 TIMES DAILY PRN
Status: DISCONTINUED | OUTPATIENT
Start: 2021-08-25 | End: 2021-08-26 | Stop reason: HOSPADM

## 2021-08-25 RX ORDER — MORPHINE SULFATE 5 MG/ML
5 INJECTION, SOLUTION INTRAMUSCULAR; INTRAVENOUS
Status: DISCONTINUED | OUTPATIENT
Start: 2021-08-25 | End: 2021-08-26 | Stop reason: HOSPADM

## 2021-08-25 RX ORDER — OXYTOCIN-SODIUM CHLORIDE 0.9% IV SOLN 30 UNIT/500ML 30-0.9/5 UT/ML-%
1-4 SOLUTION INTRAVENOUS
Status: DISCONTINUED | OUTPATIENT
Start: 2021-08-25 | End: 2021-08-26 | Stop reason: HOSPADM

## 2021-08-25 RX ORDER — SODIUM CHLORIDE 0.9 % (FLUSH) 0.9 %
3 SYRINGE (ML) INJECTION EVERY 12 HOURS SCHEDULED
Status: DISCONTINUED | OUTPATIENT
Start: 2021-08-25 | End: 2021-08-26 | Stop reason: HOSPADM

## 2021-08-25 RX ORDER — TRISODIUM CITRATE DIHYDRATE AND CITRIC ACID MONOHYDRATE 500; 334 MG/5ML; MG/5ML
30 SOLUTION ORAL ONCE AS NEEDED
Status: DISCONTINUED | OUTPATIENT
Start: 2021-08-25 | End: 2021-08-26 | Stop reason: HOSPADM

## 2021-08-25 RX ORDER — CARBOPROST TROMETHAMINE 250 UG/ML
250 INJECTION, SOLUTION INTRAMUSCULAR ONCE AS NEEDED
Status: DISCONTINUED | OUTPATIENT
Start: 2021-08-25 | End: 2021-08-26 | Stop reason: HOSPADM

## 2021-08-25 RX ORDER — GUAIFENESIN AND CODEINE PHOSPHATE 100; 10 MG/5ML; MG/5ML
5 SOLUTION ORAL EVERY 4 HOURS PRN
Status: DISCONTINUED | OUTPATIENT
Start: 2021-08-25 | End: 2021-08-27 | Stop reason: HOSPADM

## 2021-08-25 RX ORDER — MISOPROSTOL 200 UG/1
800 TABLET ORAL AS NEEDED
Status: DISCONTINUED | OUTPATIENT
Start: 2021-08-25 | End: 2021-08-26 | Stop reason: HOSPADM

## 2021-08-25 RX ORDER — FENTANYL CITRATE 50 UG/ML
INJECTION, SOLUTION INTRAMUSCULAR; INTRAVENOUS
Status: COMPLETED | OUTPATIENT
Start: 2021-08-25 | End: 2021-08-25

## 2021-08-25 RX ORDER — SODIUM CHLORIDE 0.9 % (FLUSH) 0.9 %
10 SYRINGE (ML) INJECTION AS NEEDED
Status: DISCONTINUED | OUTPATIENT
Start: 2021-08-25 | End: 2021-08-26 | Stop reason: HOSPADM

## 2021-08-25 RX ORDER — ONDANSETRON 2 MG/ML
4 INJECTION INTRAMUSCULAR; INTRAVENOUS EVERY 6 HOURS PRN
Status: DISCONTINUED | OUTPATIENT
Start: 2021-08-25 | End: 2021-08-26 | Stop reason: HOSPADM

## 2021-08-25 RX ORDER — LIDOCAINE HYDROCHLORIDE 10 MG/ML
5 INJECTION, SOLUTION EPIDURAL; INFILTRATION; INTRACAUDAL; PERINEURAL AS NEEDED
Status: DISCONTINUED | OUTPATIENT
Start: 2021-08-25 | End: 2021-08-26 | Stop reason: HOSPADM

## 2021-08-25 RX ORDER — TERBUTALINE SULFATE 1 MG/ML
0.25 INJECTION, SOLUTION SUBCUTANEOUS AS NEEDED
Status: DISCONTINUED | OUTPATIENT
Start: 2021-08-25 | End: 2021-08-26 | Stop reason: HOSPADM

## 2021-08-25 RX ORDER — CEFAZOLIN SODIUM 2 G/100ML
2 INJECTION, SOLUTION INTRAVENOUS ONCE AS NEEDED
Status: DISCONTINUED | OUTPATIENT
Start: 2021-08-25 | End: 2021-08-26

## 2021-08-25 RX ORDER — ONDANSETRON 4 MG/1
4 TABLET, FILM COATED ORAL EVERY 6 HOURS PRN
Status: DISCONTINUED | OUTPATIENT
Start: 2021-08-25 | End: 2021-08-26 | Stop reason: HOSPADM

## 2021-08-25 RX ORDER — METOCLOPRAMIDE HYDROCHLORIDE 5 MG/ML
10 INJECTION INTRAMUSCULAR; INTRAVENOUS ONCE AS NEEDED
Status: DISCONTINUED | OUTPATIENT
Start: 2021-08-25 | End: 2021-08-26 | Stop reason: HOSPADM

## 2021-08-25 RX ORDER — MAGNESIUM CARB/ALUMINUM HYDROX 105-160MG
30 TABLET,CHEWABLE ORAL ONCE
Status: COMPLETED | OUTPATIENT
Start: 2021-08-25 | End: 2021-08-26

## 2021-08-25 RX ORDER — ACETAMINOPHEN 325 MG/1
650 TABLET ORAL EVERY 4 HOURS PRN
Status: DISCONTINUED | OUTPATIENT
Start: 2021-08-25 | End: 2021-08-26 | Stop reason: HOSPADM

## 2021-08-25 RX ORDER — FENTANYL CITRATE 50 UG/ML
INJECTION, SOLUTION INTRAMUSCULAR; INTRAVENOUS
Status: COMPLETED
Start: 2021-08-25 | End: 2021-08-25

## 2021-08-25 RX ORDER — SODIUM CHLORIDE, SODIUM LACTATE, POTASSIUM CHLORIDE, CALCIUM CHLORIDE 600; 310; 30; 20 MG/100ML; MG/100ML; MG/100ML; MG/100ML
150 INJECTION, SOLUTION INTRAVENOUS CONTINUOUS
Status: DISCONTINUED | OUTPATIENT
Start: 2021-08-25 | End: 2021-08-27 | Stop reason: HOSPADM

## 2021-08-25 RX ORDER — LIDOCAINE HYDROCHLORIDE AND EPINEPHRINE 15; 5 MG/ML; UG/ML
INJECTION, SOLUTION EPIDURAL
Status: COMPLETED | OUTPATIENT
Start: 2021-08-25 | End: 2021-08-25

## 2021-08-25 RX ORDER — PROMETHAZINE HYDROCHLORIDE 12.5 MG/1
12.5 TABLET ORAL EVERY 6 HOURS PRN
Status: DISCONTINUED | OUTPATIENT
Start: 2021-08-25 | End: 2021-08-26 | Stop reason: HOSPADM

## 2021-08-25 RX ADMIN — ROPIVACAINE HYDROCHLORIDE 10 ML/HR: 5 INJECTION, SOLUTION EPIDURAL; INFILTRATION; PERINEURAL at 23:03

## 2021-08-25 RX ADMIN — LIDOCAINE HYDROCHLORIDE AND EPINEPHRINE 3 ML: 15; 5 INJECTION, SOLUTION EPIDURAL at 15:30

## 2021-08-25 RX ADMIN — FENTANYL CITRATE 100 MCG: 50 INJECTION INTRAMUSCULAR; INTRAVENOUS at 15:30

## 2021-08-25 RX ADMIN — LIDOCAINE HYDROCHLORIDE AND EPINEPHRINE 2 ML: 15; 5 INJECTION, SOLUTION EPIDURAL at 15:34

## 2021-08-25 RX ADMIN — ROPIVACAINE HYDROCHLORIDE 10 ML/HR: 5 INJECTION, SOLUTION EPIDURAL; INFILTRATION; PERINEURAL at 15:30

## 2021-08-25 RX ADMIN — ACETAMINOPHEN 650 MG: 325 TABLET ORAL at 21:52

## 2021-08-25 RX ADMIN — GUAIFENESIN AND CODEINE PHOSPHATE 5 ML: 10; 100 LIQUID ORAL at 15:11

## 2021-08-25 RX ADMIN — GUAIFENESIN AND CODEINE PHOSPHATE 5 ML: 10; 100 LIQUID ORAL at 19:24

## 2021-08-25 RX ADMIN — OXYTOCIN 1 MILLI-UNITS/MIN: 10 INJECTION, SOLUTION INTRAMUSCULAR; INTRAVENOUS at 13:13

## 2021-08-25 RX ADMIN — EPHEDRINE SULFATE 5 MG: 50 INJECTION INTRAVENOUS at 15:42

## 2021-08-25 RX ADMIN — SODIUM CHLORIDE, POTASSIUM CHLORIDE, SODIUM LACTATE AND CALCIUM CHLORIDE 150 ML/HR: 600; 310; 30; 20 INJECTION, SOLUTION INTRAVENOUS at 13:13

## 2021-08-25 NOTE — ANESTHESIA PREPROCEDURE EVALUATION
Anesthesia Evaluation     Patient summary reviewed and Nursing notes reviewed   no history of anesthetic complications:  NPO Solid Status: > 8 hours  NPO Liquid Status: > 2 hours           Airway   Mallampati: II  TM distance: >3 FB  Neck ROM: full  No difficulty expected  Dental      Pulmonary - normal exam    breath sounds clear to auscultation  (+) pulmonary embolism,   Cardiovascular - normal exam  Exercise tolerance: good (4-7 METS)    Rhythm: regular    (+) hypertension,       Neuro/Psych  (+) psychiatric history,     GI/Hepatic/Renal/Endo    (+)  GERD well controlled,      Musculoskeletal (-) negative ROS    Abdominal    Substance History - negative use     OB/GYN    (+) Pregnant, pregnancy induced hypertension        Other - negative ROS       ROS/Med Hx Other: PT IS COVID POSITIVE                Anesthesia Plan    ASA 3     epidural   (COVID POSITIVE USE ALL PRECAUTIONS)    Anesthetic plan, all risks, benefits, and alternatives have been provided, discussed and informed consent has been obtained with: patient.

## 2021-08-25 NOTE — ANESTHESIA PROCEDURE NOTES
Labor Epidural    Pre-sedation assessment completed: 8/25/2021 3:25 PM    Patient reassessed immediately prior to procedure    Patient location during procedure: OB  Start Time: 8/25/2021 3:25 PM  Stop Time: 8/25/2021 3:30 PM  Performed By  Anesthesiologist: Maninder Torres MD  Preanesthetic Checklist  Completed: patient identified, IV checked, risks and benefits discussed, surgical consent, monitors and equipment checked, pre-op evaluation and timeout performed  Prep:  Pt Position:sitting  Sterile Tech:cap, gloves, sterile barrier, mask and gown  Prep:chlorhexidine gluconate and isopropyl alcohol  Monitoring:blood pressure monitoring, continuous pulse oximetry and EKG  Epidural Block Procedure:  Approach:midline  Guidance:landmark technique and palpation technique  Location:L4-L5  Needle Type:Tuohy  Needle Gauge:17 G  Loss of Resistance Medium: saline  Loss of Resistance: 6cm  Cath Depth at skin:12 cm  Paresthesia: none  Aspiration:negative  Test Dose:negative  Test dose medication: lidocaine 1.5%-EPINEPHrine 1:200,000 (XYLOCAINE W/EPI) injection, 3 mL  fentaNYL citrate (PF) (SUBLIMAZE) injection, 100 mcg  Med administered at 8/25/2021 3:30 PM  Number of Attempts: 1  Post Assessment:  Dressing:occlusive dressing applied and secured with tape  Pt Tolerance:patient tolerated the procedure well with no apparent complications  Complications:no

## 2021-08-26 VITALS — SYSTOLIC BLOOD PRESSURE: 129 MMHG | DIASTOLIC BLOOD PRESSURE: 81 MMHG | HEART RATE: 109 BPM | OXYGEN SATURATION: 100 %

## 2021-08-26 PROBLEM — U07.1 COVID-19 AFFECTING CHILDBIRTH: Status: ACTIVE | Noted: 2021-08-26

## 2021-08-26 LAB
ABO GROUP BLD: NORMAL
BASE EXCESS BLDCOA CALC-SCNC: -6 MMOL/L
BASE EXCESS BLDCOV CALC-SCNC: -3.8 MMOL/L
BLD GP AB SCN SERPL QL: NEGATIVE
FETAL BLEED: NEGATIVE
HCO3 BLDCOA-SCNC: 23.3 MMOL/L
HCO3 BLDCOV-SCNC: 22.3 MMOL/L
NUMBER OF DOSES: NORMAL
PCO2 BLDCOA: 62.8 MMHG (ref 33–49)
PCO2 BLDCOV: 43.9 MM HG (ref 28–40)
PH BLDCOA: 7.19 PH UNITS (ref 7.21–7.31)
PH BLDCOV: 7.32 PH UNITS (ref 7.31–7.37)
PO2 BLDCOA: <40.5 MMHG
PO2 BLDCOV: <40.5 MM HG (ref 21–31)
RH BLD: NEGATIVE

## 2021-08-26 PROCEDURE — 85461 HEMOGLOBIN FETAL: CPT | Performed by: OBSTETRICS & GYNECOLOGY

## 2021-08-26 PROCEDURE — 25010000002 GENTAMICIN PER 80 MG: Performed by: OBSTETRICS & GYNECOLOGY

## 2021-08-26 PROCEDURE — 3E0P7VZ INTRODUCTION OF HORMONE INTO FEMALE REPRODUCTIVE, VIA NATURAL OR ARTIFICIAL OPENING: ICD-10-PCS | Performed by: OBSTETRICS & GYNECOLOGY

## 2021-08-26 PROCEDURE — 82803 BLOOD GASES ANY COMBINATION: CPT | Performed by: OBSTETRICS & GYNECOLOGY

## 2021-08-26 PROCEDURE — 25010000002 RHO D IMMUNE GLOBULIN 1500 UNIT/2ML SOLUTION PREFILLED SYRINGE: Performed by: OBSTETRICS & GYNECOLOGY

## 2021-08-26 PROCEDURE — 25010000002 AMPICILLIN PER 500 MG: Performed by: OBSTETRICS & GYNECOLOGY

## 2021-08-26 PROCEDURE — 0HQ9XZZ REPAIR PERINEUM SKIN, EXTERNAL APPROACH: ICD-10-PCS | Performed by: OBSTETRICS & GYNECOLOGY

## 2021-08-26 PROCEDURE — 86900 BLOOD TYPING SEROLOGIC ABO: CPT | Performed by: OBSTETRICS & GYNECOLOGY

## 2021-08-26 PROCEDURE — 86901 BLOOD TYPING SEROLOGIC RH(D): CPT | Performed by: OBSTETRICS & GYNECOLOGY

## 2021-08-26 PROCEDURE — 25010000002 ENOXAPARIN PER 10 MG: Performed by: OBSTETRICS & GYNECOLOGY

## 2021-08-26 RX ORDER — OXYTOCIN-SODIUM CHLORIDE 0.9% IV SOLN 30 UNIT/500ML 30-0.9/5 UT/ML-%
125 SOLUTION INTRAVENOUS ONCE
Status: COMPLETED | OUTPATIENT
Start: 2021-08-26 | End: 2021-08-26

## 2021-08-26 RX ORDER — HYDROCODONE BITARTRATE AND ACETAMINOPHEN 10; 325 MG/1; MG/1
1 TABLET ORAL EVERY 4 HOURS PRN
Status: DISCONTINUED | OUTPATIENT
Start: 2021-08-26 | End: 2021-08-27 | Stop reason: HOSPADM

## 2021-08-26 RX ORDER — HYDROCODONE BITARTRATE AND ACETAMINOPHEN 5; 325 MG/1; MG/1
1 TABLET ORAL EVERY 4 HOURS PRN
Status: DISCONTINUED | OUTPATIENT
Start: 2021-08-26 | End: 2021-08-27 | Stop reason: HOSPADM

## 2021-08-26 RX ORDER — ACETAMINOPHEN 500 MG
500 TABLET ORAL EVERY 6 HOURS PRN
Status: DISCONTINUED | OUTPATIENT
Start: 2021-08-26 | End: 2021-08-27 | Stop reason: HOSPADM

## 2021-08-26 RX ORDER — MISOPROSTOL 200 UG/1
600 TABLET ORAL ONCE AS NEEDED
Status: DISCONTINUED | OUTPATIENT
Start: 2021-08-26 | End: 2021-08-27 | Stop reason: HOSPADM

## 2021-08-26 RX ORDER — ACETAMINOPHEN 325 MG/1
650 TABLET ORAL EVERY 6 HOURS
Status: DISCONTINUED | OUTPATIENT
Start: 2021-08-26 | End: 2021-08-26 | Stop reason: HOSPADM

## 2021-08-26 RX ORDER — MEPERIDINE HYDROCHLORIDE 25 MG/ML
25 INJECTION INTRAMUSCULAR; INTRAVENOUS; SUBCUTANEOUS ONCE AS NEEDED
Status: ACTIVE | OUTPATIENT
Start: 2021-08-26 | End: 2021-08-26

## 2021-08-26 RX ORDER — FAMOTIDINE 20 MG/1
20 TABLET, FILM COATED ORAL ONCE AS NEEDED
Status: DISCONTINUED | OUTPATIENT
Start: 2021-08-26 | End: 2021-08-26 | Stop reason: HOSPADM

## 2021-08-26 RX ORDER — PROMETHAZINE HYDROCHLORIDE 12.5 MG/1
12.5 TABLET ORAL EVERY 4 HOURS PRN
Status: DISCONTINUED | OUTPATIENT
Start: 2021-08-26 | End: 2021-08-27 | Stop reason: HOSPADM

## 2021-08-26 RX ORDER — HYDROCODONE BITARTRATE AND ACETAMINOPHEN 5; 325 MG/1; MG/1
1 TABLET ORAL EVERY 4 HOURS PRN
Status: DISCONTINUED | OUTPATIENT
Start: 2021-08-26 | End: 2021-08-26 | Stop reason: HOSPADM

## 2021-08-26 RX ORDER — CLINDAMYCIN PHOSPHATE 900 MG/50ML
900 INJECTION INTRAVENOUS ONCE AS NEEDED
Status: CANCELLED | OUTPATIENT
Start: 2021-08-26 | End: 2021-08-30

## 2021-08-26 RX ORDER — BISACODYL 10 MG
10 SUPPOSITORY, RECTAL RECTAL DAILY PRN
Status: DISCONTINUED | OUTPATIENT
Start: 2021-08-27 | End: 2021-08-27 | Stop reason: HOSPADM

## 2021-08-26 RX ORDER — PROMETHAZINE HYDROCHLORIDE 25 MG/1
25 TABLET ORAL EVERY 6 HOURS PRN
Status: DISCONTINUED | OUTPATIENT
Start: 2021-08-26 | End: 2021-08-26 | Stop reason: HOSPADM

## 2021-08-26 RX ORDER — ONDANSETRON 2 MG/ML
4 INJECTION INTRAMUSCULAR; INTRAVENOUS EVERY 6 HOURS PRN
Status: DISCONTINUED | OUTPATIENT
Start: 2021-08-26 | End: 2021-08-26 | Stop reason: HOSPADM

## 2021-08-26 RX ORDER — IBUPROFEN 800 MG/1
800 TABLET ORAL EVERY 8 HOURS SCHEDULED
Status: DISCONTINUED | OUTPATIENT
Start: 2021-08-26 | End: 2021-08-26 | Stop reason: HOSPADM

## 2021-08-26 RX ORDER — DOCUSATE SODIUM 100 MG/1
100 CAPSULE, LIQUID FILLED ORAL DAILY
Status: DISCONTINUED | OUTPATIENT
Start: 2021-08-26 | End: 2021-08-27 | Stop reason: HOSPADM

## 2021-08-26 RX ORDER — FAMOTIDINE 10 MG/ML
20 INJECTION, SOLUTION INTRAVENOUS ONCE AS NEEDED
Status: DISCONTINUED | OUTPATIENT
Start: 2021-08-26 | End: 2021-08-26 | Stop reason: HOSPADM

## 2021-08-26 RX ORDER — SODIUM CHLORIDE 0.9 % (FLUSH) 0.9 %
1-10 SYRINGE (ML) INJECTION AS NEEDED
Status: DISCONTINUED | OUTPATIENT
Start: 2021-08-26 | End: 2021-08-27 | Stop reason: HOSPADM

## 2021-08-26 RX ORDER — ACETAMINOPHEN 325 MG/1
650 TABLET ORAL EVERY 6 HOURS PRN
Qty: 30 TABLET | Refills: 1 | Status: SHIPPED | OUTPATIENT
Start: 2021-08-26 | End: 2021-09-05

## 2021-08-26 RX ORDER — HYDROCODONE BITARTRATE AND ACETAMINOPHEN 10; 325 MG/1; MG/1
1 TABLET ORAL EVERY 4 HOURS PRN
Status: DISCONTINUED | OUTPATIENT
Start: 2021-08-26 | End: 2021-08-26 | Stop reason: HOSPADM

## 2021-08-26 RX ORDER — CARBOPROST TROMETHAMINE 250 UG/ML
250 INJECTION, SOLUTION INTRAMUSCULAR ONCE AS NEEDED
Status: DISCONTINUED | OUTPATIENT
Start: 2021-08-26 | End: 2021-08-27 | Stop reason: HOSPADM

## 2021-08-26 RX ORDER — SODIUM CHLORIDE, SODIUM LACTATE, POTASSIUM CHLORIDE, CALCIUM CHLORIDE 600; 310; 30; 20 MG/100ML; MG/100ML; MG/100ML; MG/100ML
150 INJECTION, SOLUTION INTRAVENOUS CONTINUOUS
Status: DISCONTINUED | OUTPATIENT
Start: 2021-08-26 | End: 2021-08-27 | Stop reason: HOSPADM

## 2021-08-26 RX ORDER — CALCIUM CARBONATE 200(500)MG
2 TABLET,CHEWABLE ORAL 3 TIMES DAILY PRN
Status: DISCONTINUED | OUTPATIENT
Start: 2021-08-26 | End: 2021-08-27 | Stop reason: HOSPADM

## 2021-08-26 RX ORDER — ONDANSETRON 4 MG/1
4 TABLET, FILM COATED ORAL EVERY 6 HOURS PRN
Status: DISCONTINUED | OUTPATIENT
Start: 2021-08-26 | End: 2021-08-26 | Stop reason: HOSPADM

## 2021-08-26 RX ORDER — ACETAMINOPHEN 500 MG
1000 TABLET ORAL EVERY 6 HOURS PRN
Status: DISCONTINUED | OUTPATIENT
Start: 2021-08-26 | End: 2021-08-27 | Stop reason: HOSPADM

## 2021-08-26 RX ORDER — ONDANSETRON 4 MG/1
4 TABLET, FILM COATED ORAL EVERY 8 HOURS PRN
Status: DISCONTINUED | OUTPATIENT
Start: 2021-08-26 | End: 2021-08-27 | Stop reason: HOSPADM

## 2021-08-26 RX ADMIN — HUMAN RHO(D) IMMUNE GLOBULIN 1500 UNITS: 1500 SOLUTION INTRAMUSCULAR; INTRAVENOUS at 20:21

## 2021-08-26 RX ADMIN — ACETAMINOPHEN 650 MG: 325 TABLET ORAL at 03:22

## 2021-08-26 RX ADMIN — GENTAMICIN SULFATE 300 MG: 40 INJECTION, SOLUTION INTRAMUSCULAR; INTRAVENOUS at 01:43

## 2021-08-26 RX ADMIN — SODIUM CHLORIDE, POTASSIUM CHLORIDE, SODIUM LACTATE AND CALCIUM CHLORIDE 150 ML/HR: 600; 310; 30; 20 INJECTION, SOLUTION INTRAVENOUS at 06:29

## 2021-08-26 RX ADMIN — ENOXAPARIN SODIUM 40 MG: 40 INJECTION SUBCUTANEOUS at 12:35

## 2021-08-26 RX ADMIN — DOCUSATE SODIUM 100 MG: 100 CAPSULE, LIQUID FILLED ORAL at 09:04

## 2021-08-26 RX ADMIN — Medication: at 04:48

## 2021-08-26 RX ADMIN — OXYTOCIN 125 ML/HR: 10 INJECTION, SOLUTION INTRAMUSCULAR; INTRAVENOUS at 02:35

## 2021-08-26 RX ADMIN — WITCH HAZEL 1 PAD: 500 SOLUTION RECTAL; TOPICAL at 04:48

## 2021-08-26 RX ADMIN — MINERAL OIL 30 ML: 1000 SOLUTION ORAL at 01:10

## 2021-08-26 RX ADMIN — ACETAMINOPHEN 1000 MG: 500 TABLET ORAL at 20:19

## 2021-08-27 VITALS
HEART RATE: 93 BPM | TEMPERATURE: 98.4 F | RESPIRATION RATE: 16 BRPM | OXYGEN SATURATION: 98 % | BODY MASS INDEX: 28.53 KG/M2 | DIASTOLIC BLOOD PRESSURE: 91 MMHG | HEIGHT: 63 IN | SYSTOLIC BLOOD PRESSURE: 129 MMHG | WEIGHT: 161 LBS

## 2021-08-27 PROCEDURE — 25010000002 ENOXAPARIN PER 10 MG: Performed by: OBSTETRICS & GYNECOLOGY

## 2021-08-27 RX ORDER — NIFEDIPINE 30 MG/1
30 TABLET, EXTENDED RELEASE ORAL
Status: DISCONTINUED | OUTPATIENT
Start: 2021-08-27 | End: 2021-08-27 | Stop reason: HOSPADM

## 2021-08-27 RX ORDER — NIFEDIPINE 30 MG/1
30 TABLET, EXTENDED RELEASE ORAL DAILY
Qty: 30 TABLET | Refills: 1 | Status: SHIPPED | OUTPATIENT
Start: 2021-08-27 | End: 2022-08-22

## 2021-08-27 RX ADMIN — ACETAMINOPHEN 1000 MG: 500 TABLET ORAL at 07:58

## 2021-08-27 RX ADMIN — ENOXAPARIN SODIUM 40 MG: 40 INJECTION SUBCUTANEOUS at 12:49

## 2021-08-27 RX ADMIN — ACETAMINOPHEN 1000 MG: 500 TABLET ORAL at 12:50

## 2021-08-27 RX ADMIN — DOCUSATE SODIUM 100 MG: 100 CAPSULE, LIQUID FILLED ORAL at 07:57

## 2021-08-27 RX ADMIN — DOCUSATE SODIUM 100 MG: 100 CAPSULE, LIQUID FILLED ORAL at 08:30

## 2021-08-27 RX ADMIN — ACETAMINOPHEN 1000 MG: 500 TABLET ORAL at 01:41

## 2021-08-27 RX ADMIN — NIFEDIPINE 30 MG: 30 TABLET, FILM COATED, EXTENDED RELEASE ORAL at 10:55

## 2021-08-27 NOTE — ANESTHESIA POSTPROCEDURE EVALUATION
Patient: Ana Brito    Procedure Summary     Date: 08/25/21 Room / Location:     Anesthesia Start: 1519 Anesthesia Stop: 08/26/21 0115    Procedure: LABOR ANALGESIA Diagnosis:     Scheduled Providers:  Provider: Maninder Torres MD    Anesthesia Type: epidural ASA Status: 3          Anesthesia Type: epidural    Vitals  Vitals Value Taken Time   /90 08/27/21 0800   Temp 36.6 °C (97.9 °F) 08/27/21 0800   Pulse 65 08/27/21 0800   Resp 16 08/27/21 0800   SpO2 100 % 08/26/21 0125           Post Anesthesia Care and Evaluation    Patient location during evaluation: bedside  Patient participation: complete - patient participated  Level of consciousness: awake  Pain score: 0  Pain management: adequate  Airway patency: patent  Anesthetic complications: No anesthetic complications  PONV Status: none  Cardiovascular status: acceptable and stable  Respiratory status: acceptable and room air  Hydration status: acceptable  Post Neuraxial Block status: Motor and sensory function returned to baseline and No signs or symptoms of PDPH

## 2021-08-30 ENCOUNTER — TRANSCRIBE ORDERS (OUTPATIENT)
Dept: LAB | Facility: HOSPITAL | Age: 26
End: 2021-08-30

## 2021-08-30 DIAGNOSIS — Z01.818 PREOP TESTING: Primary | ICD-10-CM

## 2021-09-03 ENCOUNTER — APPOINTMENT (OUTPATIENT)
Dept: LAB | Facility: HOSPITAL | Age: 26
End: 2021-09-03

## 2021-09-07 ENCOUNTER — TELEPHONE (OUTPATIENT)
Dept: OBSTETRICS AND GYNECOLOGY | Facility: CLINIC | Age: 26
End: 2021-09-07

## 2021-09-07 NOTE — TELEPHONE ENCOUNTER
I called and spoke with Sidra at [phone] and they do have the prescription for Procardia XL 30mg. They state that the patient's insurance is not covering the RX at the pharmacy and she is to call the number on the back of her insurance card or contact [facility]. Patient was advised of this information and to call us back if unable to get the RX filled or needed further assistance from us.

## 2021-09-07 NOTE — TELEPHONE ENCOUNTER
----- Message from Ana Brito sent at 9/6/2021 12:00 PM EDT -----  Regarding: Prescription Question  Contact: [phone]  Hello!     You put in a prescription for procardia whenever I was having my son, but the pharmacy won't fill it because it states you never sent authorization. I had some left over at home but I take my last pill today. I have been trying to get ahold of the office but no response.     I also never received a phone call for my postpartum follow up appointments. I was supposed to have a 2 week and a 5 week. This week is 2 weeks postpartum.     Thank you,    Ana Brito

## 2021-09-08 PROBLEM — F41.9 ANXIETY: Status: ACTIVE | Noted: 2021-09-08

## 2021-09-08 PROBLEM — F32.9 MAJOR DEPRESSIVE DISORDER: Status: ACTIVE | Noted: 2021-09-08

## 2021-09-08 PROBLEM — O09.899 MATERNAL VARICELLA, NON-IMMUNE: Status: ACTIVE | Noted: 2021-09-08

## 2021-09-08 PROBLEM — Z28.39 MATERNAL VARICELLA, NON-IMMUNE: Status: ACTIVE | Noted: 2021-09-08

## 2021-09-09 ENCOUNTER — POSTPARTUM VISIT (OUTPATIENT)
Dept: OBSTETRICS AND GYNECOLOGY | Facility: CLINIC | Age: 26
End: 2021-09-09

## 2021-09-09 VITALS
SYSTOLIC BLOOD PRESSURE: 134 MMHG | BODY MASS INDEX: 24.98 KG/M2 | HEART RATE: 80 BPM | DIASTOLIC BLOOD PRESSURE: 86 MMHG | WEIGHT: 141 LBS

## 2021-09-09 DIAGNOSIS — U07.1 COVID-19 AFFECTING CHILDBIRTH: ICD-10-CM

## 2021-09-09 DIAGNOSIS — Z86.711 HX OF PULMONARY EMBOLUS: Primary | ICD-10-CM

## 2021-09-09 DIAGNOSIS — F41.9 ANXIETY: ICD-10-CM

## 2021-09-09 DIAGNOSIS — Z28.39 MATERNAL VARICELLA, NON-IMMUNE: ICD-10-CM

## 2021-09-09 DIAGNOSIS — F32.9 MAJOR DEPRESSIVE DISORDER, REMISSION STATUS UNSPECIFIED, UNSPECIFIED WHETHER RECURRENT: ICD-10-CM

## 2021-09-09 DIAGNOSIS — O09.899 MATERNAL VARICELLA, NON-IMMUNE: ICD-10-CM

## 2021-09-09 DIAGNOSIS — O10.919 CHRONIC HYPERTENSION AFFECTING PREGNANCY: ICD-10-CM

## 2021-09-09 PROCEDURE — 99213 OFFICE O/P EST LOW 20 MIN: CPT | Performed by: OBSTETRICS & GYNECOLOGY

## 2021-09-09 NOTE — ASSESSMENT & PLAN NOTE
Has short fuse jc w daughter.  Feeling concerns w danger for - overwhelming.  NO SI or HI  Doesn't want meds.  Considering therapist, declines now

## 2021-09-09 NOTE — PROGRESS NOTES
Post Delivery Follow up (1-4weeks)      CC: BP check, taking not taking meds,pharmacy/insurance issue meds    Delivery type:    Perineum: 1st degree laceration, right labia minora  Feeding: Breast    HPI:        HA:  Yes  Vision changes:  No  RUQ/epigastric pain:  No  Swelling:  No  Pain:  No  Vaginal Bleeding:  No   Depressed/Anxious:  Yes and pt is anxious  EPDS score: N/A

## 2021-09-09 NOTE — PROGRESS NOTES
PHYSICAL EXAM:    General- NAD, alert and oriented, appropriate  Psych- Normal mood, good memory, good eye contact  Ext- No edema, no signs of DVT, DTR patella +1, DTR achilles NO clonus  Skin- No lesions, no rashes      ASSESSMENT AND PLAN:  Diagnoses and all orders for this visit:    1. Hx of pulmonary embolus (Primary)  Assessment & Plan:  On Lovenox 40mg 6wk PP      2. Chronic hypertension affecting pregnancy  Assessment & Plan:  Now PP, on no meds.  FU BP today wnl.      3. COVID-19 affecting childbirth  Assessment & Plan:  URI sxs better, occas dry cough      4. Major depressive disorder, remission status unspecified, unspecified whether recurrent  Assessment & Plan:  See above under anxiety      5. Anxiety  Assessment & Plan:  Has short fuse cj w daughter.  Feeling concerns w danger for - overwhelming.  NO SI or HI  Doesn't want meds.  Considering therapist, declines now      6. Lactating mother    7. Maternal varicella, non-immune  Overview:  Importance of vaccine via PCP        Counseling:    Return to office for HA unrelieved w rest/hydration/OTC meds, vision changes, increase in edema, RUQ/epigastric pain, any concerns., Check BPs at home.  Return to office for systolic >155 OR diastolic >105., Postpartum/Postop  precautions reviewed.        Follow Up:  Return in about 4 weeks (around 10/7/2021) for Pt has scheduled. AND schedule IUD at 6-7 weeks PP..          Cristal Grace, DO  2021

## 2021-10-01 ENCOUNTER — POSTPARTUM VISIT (OUTPATIENT)
Dept: OBSTETRICS AND GYNECOLOGY | Facility: CLINIC | Age: 26
End: 2021-10-01

## 2021-10-01 VITALS — SYSTOLIC BLOOD PRESSURE: 105 MMHG | BODY MASS INDEX: 24.45 KG/M2 | DIASTOLIC BLOOD PRESSURE: 74 MMHG | WEIGHT: 138 LBS

## 2021-10-01 DIAGNOSIS — F41.9 ANXIETY: ICD-10-CM

## 2021-10-01 DIAGNOSIS — Z28.39 MATERNAL VARICELLA, NON-IMMUNE: ICD-10-CM

## 2021-10-01 DIAGNOSIS — Z30.011 VISIT FOR ORAL CONTRACEPTIVE PRESCRIPTION: ICD-10-CM

## 2021-10-01 DIAGNOSIS — U07.1 COVID-19 AFFECTING CHILDBIRTH: ICD-10-CM

## 2021-10-01 DIAGNOSIS — O09.899 MATERNAL VARICELLA, NON-IMMUNE: ICD-10-CM

## 2021-10-01 DIAGNOSIS — F32.9 MAJOR DEPRESSIVE DISORDER, REMISSION STATUS UNSPECIFIED, UNSPECIFIED WHETHER RECURRENT: ICD-10-CM

## 2021-10-01 DIAGNOSIS — Z71.85 VACCINE COUNSELING: ICD-10-CM

## 2021-10-01 DIAGNOSIS — Z86.711 HX OF PULMONARY EMBOLUS: ICD-10-CM

## 2021-10-01 DIAGNOSIS — O10.919 CHRONIC HYPERTENSION AFFECTING PREGNANCY: ICD-10-CM

## 2021-10-01 PROCEDURE — 99214 OFFICE O/P EST MOD 30 MIN: CPT | Performed by: OBSTETRICS & GYNECOLOGY

## 2021-10-01 NOTE — PROGRESS NOTES
POSTPARTUM Follow Up Visit    CC:  Postpartum     HPI:      • Antepartum or Postpartum complications: Anxiety, CHTN, symptomatic covid at delivery, VZV nonimmune, hx PE on lovenox  • Delivery type:            Perineum: 1st degree laceration, R labia minora  • Delivering Provider:   Dr. Cristal Grace      • Feeding: Breast  • Pain:  No  • Vaginal Bleeding:  Off and on, light spotting  • EPDS score: 3  • Plans for BC:  Paragard IUD  Last PAP: 2021 NEG  LAST OB WEIGHT: 161lb   Last Completed Pap Smear          PAP SMEAR (Every 3 Years) Next due on 2021  HM Pap smear    2021  SCANNED - PAP SMEAR                  PHYSICAL EXAM:  /74   Wt 62.6 kg (138 lb)   LMP 2020   BMI 24.45 kg/m²   General- NAD, alert and oriented, appropriate  Psych- Normal mood, good memory      Abdomen- Soft, non distended, non tender, no masses    External genitalia- Normal.    Urethra/Bladder/Vagina- Normal, no masses, non-tender, no prolapse   STITCHES: Intact, well approximated, no evidence of infection  Cvx- Normal, no lesions, no discharge, no CMT  Uterus- Normal size, shape & consistency.  Non tender, mobile, & no prolapse  Adnexa- Normal, no mass, non-tender    Lymphatic- No palpable groin nodes  Ext- No edema, no cyanosis   Skin- No lesions, no rashes, no acanthosis nigricans      ASSESSMENT AND PLAN:  Diagnoses and all orders for this visit:    1. Lactating mother (Primary)    2. Hx of pulmonary embolus  Overview:  Cont lovenox full 6 week PP      3. Chronic hypertension affecting pregnancy  Overview:  No meds, cont to monitor for sxs or elev BP      4. COVID-19 affecting childbirth  Overview:  No symptoms  Rec vaccine, importance R/B/A/SE/E risks without vaccine      5. Major depressive disorder, remission status unspecified, unspecified whether recurrent  Overview:  No meds, cont to monitor      6. Anxiety  Overview:  No sxs, doing well, cont to monitor.      7. Maternal varicella,  non-immune  Overview:  Importance of vaccine via PCP      8. Visit for oral contraceptive prescription  Overview:  Desires IUD, paraguard        9. Vaccine counseling  Overview:  Rec flu vaccine, importance w breast feeding.        Counseling:    • Core strengthening exercises reviewed and recommended  • Kegel exercises reviewed and recommended  • Abstinence until IUD placed    Follow Up:  Return for IUD- schedule insertion- [facility] day of OV, 1mo FU after IUD for check, WWE Feb 2022.          Cristal Grace DO  10/01/2021    [facility] [place] [facility]  [facility] [place] [address]  Dept: [phone]  Dept Fax: [phone]  Loc: [phone]  Loc Fax: [phone]

## 2021-10-22 ENCOUNTER — PROCEDURE VISIT (OUTPATIENT)
Dept: OBSTETRICS AND GYNECOLOGY | Facility: CLINIC | Age: 26
End: 2021-10-22

## 2021-10-22 VITALS — DIASTOLIC BLOOD PRESSURE: 85 MMHG | WEIGHT: 138 LBS | SYSTOLIC BLOOD PRESSURE: 128 MMHG | BODY MASS INDEX: 24.45 KG/M2

## 2021-10-22 DIAGNOSIS — Z01.812 PRE-PROCEDURE LAB EXAM: Primary | ICD-10-CM

## 2021-10-22 DIAGNOSIS — Z30.430 ENCOUNTER FOR INSERTION OF INTRAUTERINE CONTRACEPTIVE DEVICE (IUD): ICD-10-CM

## 2021-10-22 DIAGNOSIS — Z30.430 ENCOUNTER FOR IUD INSERTION: ICD-10-CM

## 2021-10-22 LAB
B-HCG UR QL: NEGATIVE
EXPIRATION DATE: NORMAL
INTERNAL NEGATIVE CONTROL: NORMAL
INTERNAL POSITIVE CONTROL: NORMAL
Lab: NORMAL

## 2021-10-22 PROCEDURE — 81025 URINE PREGNANCY TEST: CPT | Performed by: OBSTETRICS & GYNECOLOGY

## 2021-10-22 PROCEDURE — 58300 INSERT INTRAUTERINE DEVICE: CPT | Performed by: OBSTETRICS & GYNECOLOGY

## 2021-10-22 RX ORDER — COPPER 313.4 MG/1
INTRAUTERINE DEVICE INTRAUTERINE ONCE
Status: COMPLETED | OUTPATIENT
Start: 2021-10-22 | End: 2021-10-22

## 2021-10-22 RX ADMIN — COPPER: 313.4 INTRAUTERINE DEVICE INTRAUTERINE at 09:42

## 2021-10-22 NOTE — PROGRESS NOTES
IUD Placement    Chief Complaint   Patient presents with   • IUD INSERTION       Sex in last 2 weeks:  No  Bhcg:  Not done  Uhcg:  Negative  Consent signed: Yes    Procedure was explained in detail.  She understands the potential risks include, but are not limited to, failure (pregnancy), pain, bleeding, uterine perforation, and infection.  Her questions have been answered.      Subjective:  No complaints, has had copper IUD previously    Objective:  /85   Wt 62.6 kg (138 lb)   BMI 24.45 kg/m²   General- NAD, alert and oriented, appropriate  Psych- Normal mood, good memory  Abdomen- Soft, non distended, non tender, no masses  External genitalia- Normal, no lesions  Vagina- Normal, no discharge  Uterus- Normal size, shape & consistency.  Non tender, mobile, & no prolapse, Anteverted  Cvx- Normal without lesion or discharge.   Betadine/Hibiclens x3.  Tenaculum placed.  Uterus sounded to 6.5cm.    Paragard IUD placed without difficulty.    Strings cut 2cm.      Patient tolerated the procedure well.    Assessment and Plan:  Diagnoses and all orders for this visit:    1. Pre-procedure lab exam (Primary)  -     POC Pregnancy, Urine    2. Encounter for insertion of intrauterine contraceptive device (IUD)  -     Paragard Intrauterine Copper IUD    Counseling:  She was counseled on the use of the IUD.  It provides contraception for 5 years (Mirena/Kyleena/Liletta) or 3 years (Lauren) or 10 years (Copper).  Failure is <1%.  It does not protect her from STDs and condoms are recommended.  She has been instructed to check the strings monthly.     .  PRECAUTIONS-- If she has any fevers >101.5F, and abdominal/pelvic pain, or bleeding > 1pad/2hours, she needs to call our office or on call/ER (after hours/weekend).  She understands the potential risk of pelvic inflammatory disease and the potential for an effect on her future fertility if she does not seek immediate evaluation.  Cramping due to the IUD should be controlled  with a OTC reliever/NSAID.  If not, she should call our office.  If she misses a period and has a positive pregnancy test she must immediately seek medical attention due to the risk of ectopic pregnancy which can be life threatening.  She understands removal can sometimes be difficult and can require surgery.    Follow Up:  Return in about 1 month (around 11/22/2021) for IUD check and WWE.        Cristal Grace, DO  10/22/2021    [facility] [place] [place] [facility]  [address] [place] [address]  Dept: [phone]  Dept Fax: [phone]  Loc: [phone]  Loc Fax: [phone]

## 2021-12-20 ENCOUNTER — OFFICE VISIT (OUTPATIENT)
Dept: OBSTETRICS AND GYNECOLOGY | Facility: CLINIC | Age: 26
End: 2021-12-20

## 2021-12-20 VITALS
HEART RATE: 89 BPM | BODY MASS INDEX: 24.8 KG/M2 | WEIGHT: 140 LBS | DIASTOLIC BLOOD PRESSURE: 97 MMHG | SYSTOLIC BLOOD PRESSURE: 142 MMHG

## 2021-12-20 DIAGNOSIS — Z30.431 IUD CHECK UP: Primary | ICD-10-CM

## 2021-12-20 DIAGNOSIS — R03.0 SINGLE EPISODE OF ELEVATED BLOOD PRESSURE: ICD-10-CM

## 2021-12-20 PROBLEM — F32.9 MAJOR DEPRESSIVE DISORDER: Status: RESOLVED | Noted: 2021-09-08 | Resolved: 2021-12-20

## 2021-12-20 PROBLEM — O09.899 MATERNAL VARICELLA, NON-IMMUNE: Status: RESOLVED | Noted: 2021-09-08 | Resolved: 2021-12-20

## 2021-12-20 PROBLEM — O10.919 CHRONIC HYPERTENSION AFFECTING PREGNANCY: Status: RESOLVED | Noted: 2021-08-23 | Resolved: 2021-12-20

## 2021-12-20 PROBLEM — Z71.85 VACCINE COUNSELING: Status: RESOLVED | Noted: 2021-10-01 | Resolved: 2021-12-20

## 2021-12-20 PROBLEM — F41.9 ANXIETY: Status: RESOLVED | Noted: 2021-09-08 | Resolved: 2021-12-20

## 2021-12-20 PROBLEM — U07.1 COVID-19 AFFECTING CHILDBIRTH: Status: RESOLVED | Noted: 2021-08-26 | Resolved: 2021-12-20

## 2021-12-20 PROBLEM — Z28.39 MATERNAL VARICELLA, NON-IMMUNE: Status: RESOLVED | Noted: 2021-09-08 | Resolved: 2021-12-20

## 2021-12-20 PROBLEM — Z86.711 HX OF PULMONARY EMBOLUS: Status: RESOLVED | Noted: 2021-08-23 | Resolved: 2021-12-20

## 2021-12-20 PROBLEM — Z30.011 VISIT FOR ORAL CONTRACEPTIVE PRESCRIPTION: Status: RESOLVED | Noted: 2021-10-01 | Resolved: 2021-12-20

## 2021-12-20 PROCEDURE — 99212 OFFICE O/P EST SF 10 MIN: CPT | Performed by: OBSTETRICS & GYNECOLOGY

## 2021-12-20 NOTE — PROGRESS NOTES
Post IUD Visit      CC:  Scheduled IUD check  Chief Complaint   Patient presents with   • IUD Check     HPI:  Pain/Cramping:  Yes cramping, mild, intermittent  Vaginal Bleeding:  No except occas spotting  Pain w sex: No  Feels strings easily:  Yes and spouse can feel them. Desires to have strings trimmed.    PHYSICAL EXAM:  /97   Pulse 89   Wt 63.5 kg (140 lb)   BMI 24.80 kg/m²     General- NAD, alert and oriented, appropriate  Psych- Normal mood, good memory  Abdomen- Soft, non distended, non tender, no masses  External genitalia- Normal, no lesions  Urethra- Normal, no masses, non tender  Vagina- Normal, no atrophy, no discharge, no prolapse  Bladder- Normal, no masses, non tender, no prolapse  Cvx- Normal, no lesions, no discharge, No cervical motion tenderness, IUD strings visable 2.5cm, strings trimmed to 1.5cm per pt desire  Uterus- Normal size, shape & consistency.  Non tender, mobile, & no prolapse  Adnexa- No mass, non tender      ASSESSMENT AND PLAN:    Diagnoses and all orders for this visit:    1. IUD check up (Primary)    2. Single episode of elevated blood pressure   -Rec FU w PCP if remains elev at home.  Check BP regularly.  Pt agrees.     Counseling:  Pt was counseled to perform monthly string checks. Keep track of menses.    RTO if <q21d, >7d long, or heavy or if positive pregnancy test.    Continue OTC motrin and/or tylenol PRN cramping    Follow Up:  Return in about 1 year (around 12/20/2022) for WWE.          Cristal Grace,   12/20/2021    [facility] [place]  [facility] [place] [address]  Dept: [phone]  Dept Fax: [phone]  Loc: [phone]  Loc Fax: [phone]      Anesthesia Volume In Cc: 0 Medical Necessity Clause: This procedure was medically necessary because the lesions that were treated were: warts that have become irritated or inflamed and are bothersome to patient. Medical Necessity Information: It is in your best interest to select a reason for this procedure from the list below. All of these items fulfill various CMS LCD requirements except the new and changing color options. Consent: The patient's verbal consent was obtained including but not limited to risks of scab or blister formation, scarring, darker or lighter pigmentary change, recurrence, incomplete removal and infection. Detail Level: Simple Post-Care Instructions: I reviewed with the patient in detail post-care instructions. Patient is to wear sunprotection, and avoid picking at any of the treated lesions. Pt may apply Vaseline or Aquaphor  to crusted or scabbing areas. Include Z78.9 (Other Specified Conditions Influencing Health Status) As An Associated Diagnosis?: No

## 2022-06-21 ENCOUNTER — OFFICE VISIT (OUTPATIENT)
Dept: OBSTETRICS AND GYNECOLOGY | Facility: CLINIC | Age: 27
End: 2022-06-21

## 2022-06-21 VITALS
HEART RATE: 78 BPM | DIASTOLIC BLOOD PRESSURE: 98 MMHG | SYSTOLIC BLOOD PRESSURE: 133 MMHG | WEIGHT: 143 LBS | BODY MASS INDEX: 25.33 KG/M2

## 2022-06-21 DIAGNOSIS — Z32.00 ENCOUNTER FOR PREGNANCY TEST, RESULT UNKNOWN: Primary | ICD-10-CM

## 2022-06-21 DIAGNOSIS — R03.0 ELEVATED BLOOD PRESSURE READING: ICD-10-CM

## 2022-06-21 PROCEDURE — 99213 OFFICE O/P EST LOW 20 MIN: CPT | Performed by: NURSE PRACTITIONER

## 2022-06-21 PROCEDURE — 81025 URINE PREGNANCY TEST: CPT | Performed by: NURSE PRACTITIONER

## 2022-06-21 NOTE — PROGRESS NOTES
GYN Problem/Follow Up Visit    Chief Complaint   Patient presents with   • IUD Check           HPI  Ana Flori Brito is a 27 y.o. female, , who presents for evaluation of IUD, paragard inserted 2021, concerned that her strings feel shorter than usual, checks strings every day when showering. Menses have always been irregular, every 14 days to 1 month, lasting 7-14 days, Declines hormone contraception. Satisfied with paragard and desires to retain.  planning vasectomy.  Seen 21, partner feeling strings , strings were trimmed to 1.5 cm    Hx of PE on Xulane    Hx of Elevated bp, off and on medications, off meds at this time    Additional OB/GYN History   Patient's last menstrual period was 2022.  Allergies : Patient has no known allergies.     The additional following portions of the patient's history were reviewed and updated as appropriate: allergies, current medications, past family history, past medical history, past social history, past surgical history and problem list.    Review of Systems    I have reviewed and agree with the HPI, ROS, and historical information as entered above. Alma Rosa Brito, APRN    Objective   /98   Pulse 78   Wt 64.9 kg (143 lb)   LMP 2022   BMI 25.33 kg/m²     Physical Exam  Vitals and nursing note reviewed. Exam conducted with a chaperone present.   Constitutional:       Appearance: Normal appearance.   Cardiovascular:      Rate and Rhythm: Normal rate.   Pulmonary:      Effort: Pulmonary effort is normal.   Genitourinary:     General: Normal vulva.      Vagina: Normal.      Cervix: Discharge (IUd strings visible at level of cervical os) present. No lesion or cervical bleeding.      Uterus: Normal.       Adnexa: Right adnexa normal and left adnexa normal.   Lymphadenopathy:      Lower Body: No right inguinal adenopathy. No left inguinal adenopathy.   Skin:     General: Skin is warm and dry.   Neurological:      Mental Status: She is  alert and oriented to person, place, and time.          Assessment and Plan    Diagnoses and all orders for this visit:    1. Encounter for pregnancy test, result unknown (Primary)  -     POC Pregnancy, Urine    2. Elevated blood pressure reading      HCG, Urine, QL   Date Value Ref Range Status   06/21/2022 Negative Negative Final        Counseling:  • TRACK MENSES, RTO if <q21d, >7d long, heavy or painful.    • All BIRTH CONTROL options R/B/A/SE/E of each reviewed in detail.   •  PCP for management of hypertension, counseled regarding cardiovascular risk associated with long term uncontrolled hypertension  • Strings visible, counseled regarding mgmt options for change of IUD string length, she declines pelvic ultrasound to confirm IUD location. Her strings are visible at cervical os. She will fu if changes with her bleeding, pelvic pain, or any further concerns. She declines alternative contraception for management of chronic irregular menstrual cycles      She understands the importance of having the above orders performed in a timely fashion.  The risks of not performing them include, but are not limited to, cancer and/or subsequent increase in morbidity and/or mortality.  She is encouraged to review her results online and/or contact or office if she has questions.     Follow Up:  Return if symptoms worsen or fail to improve.        Alma Rosa Brito, [place]  06/21/2022

## 2022-08-22 ENCOUNTER — OFFICE VISIT (OUTPATIENT)
Dept: OBSTETRICS AND GYNECOLOGY | Facility: CLINIC | Age: 27
End: 2022-08-22

## 2022-08-22 VITALS
SYSTOLIC BLOOD PRESSURE: 138 MMHG | BODY MASS INDEX: 26.32 KG/M2 | WEIGHT: 148.6 LBS | HEART RATE: 84 BPM | DIASTOLIC BLOOD PRESSURE: 89 MMHG

## 2022-08-22 DIAGNOSIS — Z30.431 ENCOUNTER FOR ROUTINE CHECKING OF INTRAUTERINE CONTRACEPTIVE DEVICE (IUD): Primary | ICD-10-CM

## 2022-08-22 DIAGNOSIS — N92.6 IRREGULAR MENSTRUATION: ICD-10-CM

## 2022-08-22 DIAGNOSIS — Z32.02 PREGNANCY EXAMINATION OR TEST, NEGATIVE RESULT: ICD-10-CM

## 2022-08-22 DIAGNOSIS — R10.2 PELVIC PAIN: ICD-10-CM

## 2022-08-22 LAB
B-HCG UR QL: NEGATIVE
BILIRUB BLD-MCNC: NEGATIVE MG/DL
CLARITY, POC: CLEAR
COLOR UR: YELLOW
DEPRECATED RDW RBC AUTO: 40.5 FL (ref 37–54)
ERYTHROCYTE [DISTWIDTH] IN BLOOD BY AUTOMATED COUNT: 12.7 % (ref 12.3–15.4)
EXPIRATION DATE: NORMAL
GLUCOSE UR STRIP-MCNC: NEGATIVE MG/DL
HBA1C MFR BLD: 5 % (ref 4.8–5.6)
HCT VFR BLD AUTO: 38.9 % (ref 34–46.6)
HGB BLD-MCNC: 12.7 G/DL (ref 12–15.9)
INTERNAL NEGATIVE CONTROL: NEGATIVE
INTERNAL POSITIVE CONTROL: POSITIVE
KETONES UR QL: NEGATIVE
LEUKOCYTE EST, POC: NEGATIVE
Lab: NORMAL
MCH RBC QN AUTO: 28.5 PG (ref 26.6–33)
MCHC RBC AUTO-ENTMCNC: 32.6 G/DL (ref 31.5–35.7)
MCV RBC AUTO: 87.2 FL (ref 79–97)
NITRITE UR-MCNC: NEGATIVE MG/ML
PH UR: 6.5 [PH] (ref 5–8)
PLATELET # BLD AUTO: 256 10*3/MM3 (ref 140–450)
PMV BLD AUTO: 11.4 FL (ref 6–12)
PROT UR STRIP-MCNC: NEGATIVE MG/DL
RBC # BLD AUTO: 4.46 10*6/MM3 (ref 3.77–5.28)
RBC # UR STRIP: NEGATIVE /UL
SP GR UR: 1.02 (ref 1–1.03)
T4 FREE SERPL-MCNC: 1.09 NG/DL (ref 0.93–1.7)
TSH SERPL DL<=0.05 MIU/L-ACNC: 1.74 UIU/ML (ref 0.27–4.2)
UROBILINOGEN UR QL: NORMAL
WBC NRBC COR # BLD: 5.94 10*3/MM3 (ref 3.4–10.8)

## 2022-08-22 PROCEDURE — 83036 HEMOGLOBIN GLYCOSYLATED A1C: CPT | Performed by: NURSE PRACTITIONER

## 2022-08-22 PROCEDURE — 36415 COLL VENOUS BLD VENIPUNCTURE: CPT | Performed by: NURSE PRACTITIONER

## 2022-08-22 PROCEDURE — 84443 ASSAY THYROID STIM HORMONE: CPT | Performed by: NURSE PRACTITIONER

## 2022-08-22 PROCEDURE — 84439 ASSAY OF FREE THYROXINE: CPT | Performed by: NURSE PRACTITIONER

## 2022-08-22 PROCEDURE — 99212 OFFICE O/P EST SF 10 MIN: CPT | Performed by: NURSE PRACTITIONER

## 2022-08-22 PROCEDURE — 81002 URINALYSIS NONAUTO W/O SCOPE: CPT | Performed by: NURSE PRACTITIONER

## 2022-08-22 PROCEDURE — 81025 URINE PREGNANCY TEST: CPT | Performed by: NURSE PRACTITIONER

## 2022-08-22 PROCEDURE — 85027 COMPLETE CBC AUTOMATED: CPT | Performed by: NURSE PRACTITIONER

## 2022-08-22 NOTE — PROGRESS NOTES
GYN Visit    CC:   Chief Complaint   Patient presents with   • Gynecologic Exam     Iud check        HPI:   27 y.o. Contraception or HRT: Contraception:  Paragard IUD  Cycle is 29 days late, worried she is pregnant.  Cycle has always been irregular but usually has two a month.  Can feel stings.  Also having left-sided pelvic pain.  Also feels bloated and has had diarrhea.  Tender over lower abdomen.   Did have some spotting a few weeks ago      History: PMHx, Meds, Allergies, PSHx, Social Hx, and POBHx all reviewed and updated.    Review of Systems   Constitutional: Negative.    HENT: Negative.    Eyes: Negative.    Respiratory: Negative.    Cardiovascular: Negative.    Endocrine: Negative.    Genitourinary: Positive for menstrual problem and pelvic pain.   Musculoskeletal: Negative.    Skin: Negative.    Allergic/Immunologic: Negative.         No new allergies.   Neurological: Negative.    Hematological: Negative.    Psychiatric/Behavioral: Negative.        PHYSICAL EXAM:  /89 (BP Location: Left arm, Patient Position: Sitting, Cuff Size: Adult)   Pulse 84   Wt 67.4 kg (148 lb 9.6 oz)   Breastfeeding No   BMI 26.32 kg/m²      Physical Exam  Vitals and nursing note reviewed. Exam conducted with a chaperone present.   Constitutional:       Appearance: Normal appearance. She is well-developed and well-groomed.   HENT:      Head: Normocephalic.   Eyes:      Pupils: Pupils are equal, round, and reactive to light.   Genitourinary:     General: Normal vulva.      Exam position: Lithotomy position.      Pubic Area: No rash or pubic lice.       Labia:         Right: No rash, tenderness, lesion or injury.         Left: No rash, tenderness, lesion or injury.       Vagina: Normal. No foreign body. No vaginal discharge, erythema, tenderness, bleeding or lesions.      Cervix: No cervical motion tenderness or discharge.         Skin:     General: Skin is warm and dry.   Neurological:      General: No focal deficit  present.      Mental Status: She is alert.     Urine pregnancy test is negative.    ASSESSMENT AND PLAN:  Diagnoses and all orders for this visit:    1. Encounter for routine checking of intrauterine contraceptive device (IUD) (Primary)  -     POC Urinalysis Dipstick  -     POC Pregnancy, Urine  -     US Non-ob Transvaginal; Future    2. Irregular menstruation  -     T4, Free  -     TSH  -     CBC (No Diff)  -     Hemoglobin A1c  -     US Non-ob Transvaginal; Future    3. Pelvic pain        Counseling:  • Will check labs and ultrasound, will call with results.    Follow Up:  Return as needed.          Justyn Walton, APRN  08/22/2022    [facility] [place] [facility] [address]  [facility]  [address] [address]  Dept: [phone]  Loc: [phone]

## 2022-09-16 ENCOUNTER — HOSPITAL ENCOUNTER (OUTPATIENT)
Dept: ULTRASOUND IMAGING | Facility: HOSPITAL | Age: 27
Discharge: HOME | End: 2022-09-16
Admitting: NURSE PRACTITIONER

## 2022-09-16 DIAGNOSIS — Z30.431 ENCOUNTER FOR ROUTINE CHECKING OF INTRAUTERINE CONTRACEPTIVE DEVICE (IUD): ICD-10-CM

## 2022-09-16 DIAGNOSIS — N92.6 IRREGULAR MENSTRUATION: ICD-10-CM

## 2022-09-16 PROCEDURE — 76830 TRANSVAGINAL US NON-OB: CPT

## 2023-02-16 NOTE — PROGRESS NOTES
IUD Removal      Chief Complaint   Patient presents with   • IUD Removal       I reviewed the procedure in detail.  She understand the potential risks include, but are not limited to, pain, bleeding, and infection.  Her questions have been answered.    Subjective:   got vasectomy, he has been cleared.  Cramps bad w menses.  For 4 days heavy w copper IUD.     Objective:  /76   Wt 67.6 kg (149 lb)   LMP 02/12/2023 (Exact Date)   BMI 26.39 kg/m²   General- NAD, alert and oriented, appropriate  Psych- Normal mood, good memory  Abdomen- Soft, non distended, non tender, no masses  External genitalia- Normal, no lesions  Vagina- Normal, no discharge  Cervix- Normal without lesion or discharge. IUD REMOVAL: Strings visualized, IUD removed intact.  Discarded.    Patient tolerated the procedure well.    ASSESSMENT AND PLAN:    Diagnoses and all orders for this visit:    1. Encounter for IUD removal (Primary)      If painful and/or heavy menses persist, call for US and lab work to include CBC, TSH, free T4 and prolactin, then OV with me after    Counseling:  She understand she can become pregnant immediately.  I recommend she keep track of menses and RTO if <q21d, >7d long, heavy or painful.            PRECAUTIONS - She was advised to watch for fever, chills, vaginal discharge or odor.      Follow Up:  Return in about 1 year (around 2/20/2024) for WWE.        Cristal Grace,   02/20/2023    [facility] [place] [facility]  [facility] [place] [address]  Dept: [phone]  Dept Fax: [phone]  Loc: [phone]  Loc Fax: [phone]

## 2023-02-20 ENCOUNTER — OFFICE VISIT (OUTPATIENT)
Dept: OBSTETRICS AND GYNECOLOGY | Facility: CLINIC | Age: 28
End: 2023-02-20
Payer: COMMERCIAL

## 2023-02-20 VITALS — BODY MASS INDEX: 26.39 KG/M2 | WEIGHT: 149 LBS | DIASTOLIC BLOOD PRESSURE: 76 MMHG | SYSTOLIC BLOOD PRESSURE: 122 MMHG

## 2023-02-20 DIAGNOSIS — Z30.432 ENCOUNTER FOR IUD REMOVAL: Primary | ICD-10-CM

## 2023-02-20 PROCEDURE — 58301 REMOVE INTRAUTERINE DEVICE: CPT | Performed by: OBSTETRICS & GYNECOLOGY

## 2023-04-06 PROCEDURE — 87081 CULTURE SCREEN ONLY: CPT | Performed by: NURSE PRACTITIONER

## 2023-06-12 NOTE — PROGRESS NOTES
GYN Visit    Chief Complaint   Patient presents with   • Menstrual Problem     Cramping starts about 2 weeks before starting menses.       HPI:   28 y.o. LMP: Patient's last menstrual period was 2023 (exact date).     Social History     Substance and Sexual Activity   Sexual Activity Yes   • Partners: Male   • Birth control/protection: Vasectomy       Menses:   missed April.  May lasted 5days, change q 2hrs on heavy days.  Just started yesterday    Pain starts 2weeks before menses, and angry/mean/easily agitated 2weeks before menses.  Pain is so severe, affects ADLs.  Since paragard removed HA worse starts the week before.     No recent labs, Pap in  negative    ROS- no excessive hair growth    History: PMHx, Meds, Allergies, PSHx, Social Hx, and POBHx all reviewed and updated.    PHYSICAL EXAM:  /74   Wt 70.3 kg (155 lb)   LMP 2023 (Exact Date)   BMI 28.35 kg/m²   General- NAD, alert and oriented, appropriate  Psych- Normal mood, good memory    External genitalia- Normal female, no lesions  Urethra/meatus- Normal, no masses, non tender, no prolapse  Bladder- Normal, no masses, non tender, no prolapse  Vagina- Normal, no atrophy, no lesions, no discharge, no prolapse  Cervix- Normal, no lesions, no discharge, No cervical motion tenderness  Uterus- Normal size, shape & consistency.  Non tender, mobile, & no prolapse   Adnexa- No mass, non tender  Anus/Rectum/Perineum- Not performed    Lymphatic- No palpable groin nodes  Extremities- No edema, no cyanosis    Skin- No lesions, no rashes      ASSESSMENT AND PLAN:  Diagnoses and all orders for this visit:    1. Irregular menses (Primary)  -     Prolactin  -     TSH  -     T4, Free  -     17-Hydroxyprogesterone  -     DHEA-Sulfate  -     Testosterone, Bioavailable (M)    2. Pelvic pain midcyclic  -     US Pelvis Transvaginal Non OB; Future    3. PMDD (premenstrual dysphoric disorder)  -     FLUoxetine HCl, PMDD, 10 MG tablet; Take 10 mg by  mouth Daily.  Dispense: 30 each; Refill: 2    4. Hx of pulmonary embolus  Comments:  We could always consider a progestin only option to help with cyclic hormonal changes  Overview:  With nuvaring 2018, thrombophilia panel neg      5. Family history of PCOS  Overview:  Sister  Paternal GM    Orders:  -     17-Hydroxyprogesterone  -     DHEA-Sulfate  -     Testosterone, Bioavailable (M)        Follow Up:  Return in about 4 weeks (around 7/11/2023) for FU US.          Cristal Grace DO  06/13/2023    [facility]  [facility] [place] [address]  Dept: [phone]  Dept Fax: [phone]  Loc: [phone]  Loc Fax: [phone]

## 2023-06-13 ENCOUNTER — OFFICE VISIT (OUTPATIENT)
Dept: OBSTETRICS AND GYNECOLOGY | Facility: CLINIC | Age: 28
End: 2023-06-13
Payer: COMMERCIAL

## 2023-06-13 VITALS — SYSTOLIC BLOOD PRESSURE: 118 MMHG | BODY MASS INDEX: 28.35 KG/M2 | WEIGHT: 155 LBS | DIASTOLIC BLOOD PRESSURE: 74 MMHG

## 2023-06-13 DIAGNOSIS — Z86.711 HX OF PULMONARY EMBOLUS: ICD-10-CM

## 2023-06-13 DIAGNOSIS — F32.81 PMDD (PREMENSTRUAL DYSPHORIC DISORDER): ICD-10-CM

## 2023-06-13 DIAGNOSIS — Z84.2 FAMILY HISTORY OF PCOS: ICD-10-CM

## 2023-06-13 DIAGNOSIS — N92.6 IRREGULAR MENSES: Primary | ICD-10-CM

## 2023-06-13 DIAGNOSIS — R10.2 PELVIC PAIN: ICD-10-CM

## 2023-06-13 LAB
PROLACTIN SERPL-MCNC: 3.08 NG/ML (ref 4.79–23.3)
T4 FREE SERPL-MCNC: 1.04 NG/DL (ref 0.93–1.7)
TSH SERPL DL<=0.05 MIU/L-ACNC: 2.01 UIU/ML (ref 0.27–4.2)

## 2023-06-13 PROCEDURE — 83498 ASY HYDROXYPROGESTERONE 17-D: CPT | Performed by: OBSTETRICS & GYNECOLOGY

## 2023-06-13 PROCEDURE — 84146 ASSAY OF PROLACTIN: CPT | Performed by: OBSTETRICS & GYNECOLOGY

## 2023-06-13 PROCEDURE — 82627 DEHYDROEPIANDROSTERONE: CPT | Performed by: OBSTETRICS & GYNECOLOGY

## 2023-06-13 PROCEDURE — 84439 ASSAY OF FREE THYROXINE: CPT | Performed by: OBSTETRICS & GYNECOLOGY

## 2023-06-13 PROCEDURE — 84410 TESTOSTERONE BIOAVAILABLE: CPT | Performed by: OBSTETRICS & GYNECOLOGY

## 2023-06-13 PROCEDURE — 84443 ASSAY THYROID STIM HORMONE: CPT | Performed by: OBSTETRICS & GYNECOLOGY

## 2023-06-13 RX ORDER — FLUOXETINE 10 MG/1
10 TABLET ORAL DAILY
Qty: 30 EACH | Refills: 2 | Status: SHIPPED | OUTPATIENT
Start: 2023-06-13

## 2023-06-14 PROBLEM — N92.6 IRREGULAR MENSES: Status: ACTIVE | Noted: 2023-06-14

## 2023-06-15 LAB — DHEA-S SERPL-MCNC: 230 UG/DL (ref 84.8–378)

## 2023-06-18 LAB — 17OHP SERPL-MCNC: 22 NG/DL

## 2023-06-19 LAB
TESTOST SERPL-MCNC: 17 NG/DL
TESTOSTERONE.FREE+WB MFR SERPL: 21.4 %
TESTOSTERONE.FREE+WB SERPL-MCNC: 3.6 NG/DL

## 2023-07-13 PROBLEM — E28.2 PCOS (POLYCYSTIC OVARIAN SYNDROME): Status: ACTIVE | Noted: 2023-06-14

## 2023-07-18 PROBLEM — Z84.2 FAMILY HISTORY OF PCOS: Status: RESOLVED | Noted: 2023-06-13 | Resolved: 2023-07-18

## 2023-07-20 PROBLEM — E78.00 HYPERCHOLESTEREMIA: Status: ACTIVE | Noted: 2023-07-20

## 2023-08-11 NOTE — PROGRESS NOTES
"GYN Visit    Chief Complaint   Patient presents with    Follow-up     Labs        HPI:   28 y.o. LMP: Patient's last menstrual period was 2023 (exact date).     Social History     Substance and Sexual Activity   Sexual Activity Yes    Partners: Male    Birth control/protection: Vasectomy     Lipid Panel (2023 09:08)    Insulin, Total (2023 09:08)    Comprehensive Metabolic Panel (2023 09:08)    Office Visit with Cristal Grace DO (2023)    Seen in July.  Mid cyclic pelvic pain with a normal ultrasound.      Ana has had a history of pulmonary embolus with NuvaRing back in 2018 with negative thrombophilia panel. Slynd is the only hormone progestin option that she has not tried.  All others have caused significant side effects.  She also had declined a diagnostic laparoscopy.    PMDD, Sarafem discontinued secondary to use of Pristiq and dosage increase w PCP.  Still has bad days, but didn't take meds until later in the day.  If takes meds is better and wants to continue current dose. Therapy hasn't helped in the past.     Meets PCOS criteria with anovulation and ultrasound appearance of ovaries.  Labs normal except elevated cholesterol and LDL.  No evidence of insulin resistance.      LMP ,  w vas, home preg test neg.     History: PMHx, Meds, Allergies, PSHx, Social Hx, and POBHx all reviewed and updated.    PHYSICAL EXAM:  /88   Pulse 71   Ht 157.5 cm (62.01\")   Wt 70.3 kg (155 lb)   LMP 2023 (Exact Date)   BMI 28.34 kg/mý   General- NAD, alert and oriented, appropriate  Psych- Normal mood, good memory    ASSESSMENT AND PLAN:  Diagnoses and all orders for this visit:    1. Pelvic pain midcyclic (Primary)  Overview:  2023 US wnl except multiple bilat ov follicles    Orders:  -     Drospirenone 4 MG tablet; Take 1 tablet by mouth Daily.  Dispense: 28 tablet; Refill: 12    2. PMDD (premenstrual dysphoric disorder)  Overview:  On Pristiq w " PCP    Orders:  -     Drospirenone 4 MG tablet; Take 1 tablet by mouth Daily.  Dispense: 28 tablet; Refill: 12    3. PCOS (polycystic ovarian syndrome)  Overview:  Criteria w anovulation and US appearance of ovaries  June 2023 nl TFTs, prolactin mildly low, normal DHEA-S, normal 17 OHP, nl testosterone  July 2023 US wnl except multiple bilat ov follicles, no insulin resistance.  Elev chol/LDL    Orders:  -     Drospirenone 4 MG tablet; Take 1 tablet by mouth Daily.  Dispense: 28 tablet; Refill: 12    We discussed today the risk, benefits, alternatives, side effects, efficacy of treatment options including Slynd which could help not only with mid cyclic pain, but also for PMDD and PCOS versus diagnostic laparoscopy.  Patient declines diagnostic laparoscopy at this point and desires medical management.  Will plan L/S if pain persists.      Follow Up:  Return in about 3 months (around 11/15/2023) for FU meds.          Cristal Grace DO  08/15/2023    [facility]  [address] [place] [address]  Dept: [phone]  Dept Fax: [phone]  Loc: [phone]  Loc Fax: [phone]

## 2023-08-15 ENCOUNTER — OFFICE VISIT (OUTPATIENT)
Dept: OBSTETRICS AND GYNECOLOGY | Facility: CLINIC | Age: 28
End: 2023-08-15
Payer: COMMERCIAL

## 2023-08-15 VITALS
HEIGHT: 62 IN | SYSTOLIC BLOOD PRESSURE: 130 MMHG | DIASTOLIC BLOOD PRESSURE: 88 MMHG | BODY MASS INDEX: 28.52 KG/M2 | WEIGHT: 155 LBS | HEART RATE: 71 BPM

## 2023-08-15 DIAGNOSIS — E28.2 PCOS (POLYCYSTIC OVARIAN SYNDROME): ICD-10-CM

## 2023-08-15 DIAGNOSIS — R10.2 PELVIC PAIN: Primary | ICD-10-CM

## 2023-08-15 DIAGNOSIS — F32.81 PMDD (PREMENSTRUAL DYSPHORIC DISORDER): ICD-10-CM

## 2023-08-15 RX ORDER — DESVENLAFAXINE SUCCINATE 100 MG/1
100 TABLET, EXTENDED RELEASE ORAL DAILY
COMMUNITY
Start: 2023-08-01

## 2023-08-18 ENCOUNTER — TELEPHONE (OUTPATIENT)
Dept: OBSTETRICS AND GYNECOLOGY | Facility: CLINIC | Age: 28
End: 2023-08-18
Payer: COMMERCIAL

## 2023-10-02 NOTE — PROGRESS NOTES
GYN Visit    Chief Complaint   Patient presents with    Right breast lump       HPI:   28 y.o. LMP: Patient's last menstrual period was 2023 (exact date).     Social History     Substance and Sexual Activity   Sexual Activity Yes    Partners: Male    Birth control/protection: Vasectomy     For a couple weeks right axillary and breast pain, radiates to front of chest.  Intermittent.  Now constant for 3-4 days.  No inciting event.  Now is more consistent.  No lumps in breast.  No nipple dc. Mom w 2 breast bx's benign.  Maternal fhx breast cancer.     History: PMHx, Meds, Allergies, PSHx, Social Hx, and POBHx all reviewed and updated.    PHYSICAL EXAM:  /76   Wt 71.2 kg (157 lb)   LMP 2023 (Exact Date)   BMI 28.72 kg/m²   Facility age limit for growth percentiles is 20 years.  General- NAD, alert and oriented, appropriate  Psych- Normal mood, good memory    Breast left-  Bilaterally symmetrical, no masses, non tender, no nipple discharge, no axillary or supraclavicular nodes palpable  Breast right- Bilaterally symmetrical, no masses, tender upper outer quad and axilla, no nipple discharge, no axillary or supraclavicular nodes palpable    Extremities- No edema, no cyanosis    Skin- No lesions, no rashes      ASSESSMENT AND PLAN:  Diagnoses and all orders for this visit:    1. Mastalgia (Primary)  -     US Axilla Right; Future  -     US Breast Right Limited; Future    2. Family history of breast cancer          Follow Up:  Return if symptoms worsen or fail to improve.          Cristal Grace DO  10/05/2023    [facility] [place] [facility]  [address] [place] [address]  Dept: [phone]  Dept Fax: [phone]  Loc: [phone]  Loc Fax: [phone]

## 2023-10-05 ENCOUNTER — OFFICE VISIT (OUTPATIENT)
Dept: OBSTETRICS AND GYNECOLOGY | Facility: CLINIC | Age: 28
End: 2023-10-05
Payer: COMMERCIAL

## 2023-10-05 VITALS — SYSTOLIC BLOOD PRESSURE: 118 MMHG | WEIGHT: 157 LBS | BODY MASS INDEX: 28.72 KG/M2 | DIASTOLIC BLOOD PRESSURE: 76 MMHG

## 2023-10-05 DIAGNOSIS — Z80.3 FAMILY HISTORY OF BREAST CANCER: ICD-10-CM

## 2023-10-05 DIAGNOSIS — N64.4 MASTALGIA: Primary | ICD-10-CM

## 2023-10-30 ENCOUNTER — HOSPITAL ENCOUNTER (OUTPATIENT)
Dept: ULTRASOUND IMAGING | Facility: HOSPITAL | Age: 28
Discharge: HOME | End: 2023-10-30
Payer: COMMERCIAL

## 2023-10-30 DIAGNOSIS — N64.4 MASTALGIA: ICD-10-CM

## 2023-10-30 PROCEDURE — 76642 ULTRASOUND BREAST LIMITED: CPT

## 2023-11-01 NOTE — PROGRESS NOTES
"GYN Visit    Chief Complaint   Patient presents with    Follow-up     Med follow up        HPI:   28 y.o. LMP: Patient's last menstrual period was 10/16/2023 (exact date).     Social History     Substance and Sexual Activity   Sexual Activity Yes    Partners: Male    Birth control/protection: Vasectomy     Office Visit with Cristal Grace DO (08/15/2023) patient seen in August for mid cyclic pelvic pain and PMDD.  On Pristiq with PCP.  Ultrasound normal except consistent with PCOS.  Slynd started.  Known history of PCOS with chronic anovulation.  No evidence of insulin resistance.  We previously discussed diagnostic laparoscopy if pain persisted.  Since slynd sex drive dec, now w acne.    Menses normalized:   q mo since slynd, lasts 3-8 days, light liner only    Past medical history significant for history of PE with NuvaRing in 2018.  Thrombophilia panel was negative.     PMDD improved, now up to 100mg Pristiq- PCP    Pain persisting, severe cramps, in hips, starts 2weeks before, low pelvis bilat L>R and low back    2 migraines a week.  Requires meds.  HA not w menses, either before of after.  Had to leave work once.  No loss of vision or change. Never seen neuro.  Never had HA until needed to come of estrogen containing BC (PE).    Progress Notes by Cristal Grace DO (10/05/2023 14:15)  US Breast Right Limited (10/30/2023 09:47)  Still has pain right breast axilla    ROS- no urinary freq, 1-2x nocturia, no UTI sxs.  Hx of \"bowel issues\", constipated and diarrhea.  No gas pains.  No hematochezia. Pain not related to GI sxs.    No Fhx endometriosis    History: PMHx, Meds, Allergies, PSHx, Social Hx, and POBHx all reviewed and updated.    PHYSICAL EXAM:  /70   Pulse 92   Ht 157.5 cm (62.01\")   Wt 72.1 kg (159 lb)   LMP 10/16/2023 (Exact Date)   BMI 29.07 kg/m²   Facility age limit for growth %stephen is 20 years.  General- NAD, alert and oriented, appropriate  Psych- Normal mood, good memory    ASSESSMENT " AND PLAN:  Diagnoses and all orders for this visit:    1. Pelvic pain midcyclic, severe cramping (Primary)  Overview:  July 2023 US wnl except multiple bilat ov follicles  Refractory to medical management      2. PMDD (premenstrual dysphoric disorder)  Comments:  Improved  Overview:  On Pristiq w PCP      3. Hx of pulmonary embolus  Overview:  With nuvaring 2018, thrombophilia panel neg  S/p 6mo anticoagulation  Stable on no anticoagulation      4. Mastalgia  Comments:  Normal breast and axillary ultrasound, persists  Assessment & Plan:  We discussed importance of a support bra and avoiding underwire bra.  Limit caffeine, fat and salt intake.  Evening primrose oil 2000-300 mg daily is an option.  NSAIDs can also be tried.  And vitamin 400U QD or BID over-the-counter.  If it persists, option to refer to breast specialist-Dr. Burrell.      5. Nonintractable headache, unspecified chronicity pattern, unspecified headache type  Comments:  Not cyclic.  No option estrogen w hx PE.  Rec eval PCP, possible neuro    6. Alternating constipation and diarrhea  Comments:  Recommend follow-up with PCP, may need GI referral, possible IBS.  I do not believe this is contributing to her pain    Options, risk, benefits, alternatives, side effects, efficacy: Change to NE or Depo, out pt L/S, hyst +/- LSO     Patient prefers Dx L/S, orders completed today, scheduled        Follow Up:  Return for Preop OV.          Cristal Grace DO  11/06/2023    [facility] [place] [facility]  [facility] [place] [address]  Dept: [phone]  Dept Fax: [phone]  Loc: [phone]  Loc Fax: [phone]

## 2023-11-06 ENCOUNTER — PREP FOR SURGERY (OUTPATIENT)
Dept: OTHER | Facility: HOSPITAL | Age: 28
End: 2023-11-06
Payer: COMMERCIAL

## 2023-11-06 ENCOUNTER — OFFICE VISIT (OUTPATIENT)
Dept: OBSTETRICS AND GYNECOLOGY | Facility: CLINIC | Age: 28
End: 2023-11-06
Payer: COMMERCIAL

## 2023-11-06 VITALS
HEIGHT: 62 IN | HEART RATE: 92 BPM | DIASTOLIC BLOOD PRESSURE: 70 MMHG | BODY MASS INDEX: 29.26 KG/M2 | SYSTOLIC BLOOD PRESSURE: 131 MMHG | WEIGHT: 159 LBS

## 2023-11-06 DIAGNOSIS — Z86.711 HX OF PULMONARY EMBOLUS: ICD-10-CM

## 2023-11-06 DIAGNOSIS — R10.2 PELVIC PAIN: Primary | ICD-10-CM

## 2023-11-06 DIAGNOSIS — R19.8 ALTERNATING CONSTIPATION AND DIARRHEA: ICD-10-CM

## 2023-11-06 DIAGNOSIS — F32.81 PMDD (PREMENSTRUAL DYSPHORIC DISORDER): ICD-10-CM

## 2023-11-06 DIAGNOSIS — R51.9 NONINTRACTABLE HEADACHE, UNSPECIFIED CHRONICITY PATTERN, UNSPECIFIED HEADACHE TYPE: ICD-10-CM

## 2023-11-06 DIAGNOSIS — N64.4 MASTALGIA: ICD-10-CM

## 2023-11-06 RX ORDER — ONDANSETRON 2 MG/ML
4 INJECTION INTRAMUSCULAR; INTRAVENOUS EVERY 6 HOURS PRN
OUTPATIENT
Start: 2023-11-06

## 2023-11-06 RX ORDER — SODIUM CHLORIDE 0.9 % (FLUSH) 0.9 %
3 SYRINGE (ML) INJECTION EVERY 12 HOURS SCHEDULED
OUTPATIENT
Start: 2023-11-06

## 2023-11-06 RX ORDER — SODIUM CHLORIDE 9 MG/ML
40 INJECTION, SOLUTION INTRAVENOUS AS NEEDED
OUTPATIENT
Start: 2023-11-06

## 2023-11-06 RX ORDER — SODIUM CHLORIDE 0.9 % (FLUSH) 0.9 %
10 SYRINGE (ML) INJECTION AS NEEDED
OUTPATIENT
Start: 2023-11-06

## 2023-11-06 RX ORDER — SODIUM CHLORIDE, SODIUM LACTATE, POTASSIUM CHLORIDE, CALCIUM CHLORIDE 600; 310; 30; 20 MG/100ML; MG/100ML; MG/100ML; MG/100ML
150 INJECTION, SOLUTION INTRAVENOUS CONTINUOUS
OUTPATIENT
Start: 2023-11-06

## 2023-11-06 NOTE — ASSESSMENT & PLAN NOTE
We discussed importance of a support bra and avoiding underwire bra.  Limit caffeine, fat and salt intake.  NSAIDs.  OTC evening primrose oil 5664-3892 mg daily is an option.   Vitamin E 400U QD or BID over-the-counter.  If it persists, option to refer to breast specialist-Dr. Burrell.

## 2023-11-22 NOTE — PROGRESS NOTES
GYN HISTORY & PHYSICAL      Patient Name: Ana Brito  : 1995  MRN: [id number]    Subjective     Chief Complaint:   Chief Complaint   Patient presents with    Follow-up     Pre op        HPI:  28 y.o.  Patient's last menstrual period was 10/16/2023 (exact date).  Social History     Substance and Sexual Activity   Sexual Activity Yes    Partners: Male    Birth control/protection: Vasectomy     Office Visit with Cristal Grace DO (2023)    Mid cyclic pelvic pain.  Ultrasound normal except consistent with known polycystic ovarian syndrome.  Slynd started, however caused decreased sex drive and acne.  Pain persists with severe cramps in hips, starts 2 weeks before menses, low in pelvis bilateral left more than right and low back.  Patient desires diagnostic laparoscopy and declines any further medical treatment management options.  No family history of endometriosis.  Past medical history significant for history of PE with NuvaRing in 2018, thrombophilia panel negative.    Today pain worseing, burning, bloating, heavy in pelvis area, bilat low pelvis equal.    ROS: All negative except listed in HPI    Personal History     PMHx:    Past Medical History:   Diagnosis Date    Anxiety     COVID-19 affecting childbirth 2021    Depression     Gestational hypertension     Hypertension     CHRONIC NO MEDS    Major depressive disorder 2021    No meds, cont to monitor    Migraine withOUT aura     PCOS (polycystic ovarian syndrome) 2023    Pulmonary embolism 2018    WHILE USING NUVARING.  THROMBOPHILIA PANEL NEG AND NO FHX ARBEN.       OBHx:   OB History    Para Term  AB Living   2 2 2     2   SAB IAB Ectopic Molar Multiple Live Births             2      # Outcome Date GA Lbr Will/2nd Weight Sex Delivery Anes PTL Lv   2 Term 22 37w3d 11:30 / 00:30 2890 g (6 lb 5.9 oz) M Vag-Spont EPI N BEBETO   1 Term 18 37w1d  2892 g (6 lb 6 oz) F Vag-Spont EPI N BEBETO      Birth  Comments: BP remained elevated postpartum      Complications: Pregnancy-induced hypertension        Medications:  Drospirenone, Enoxaparin Sodium, SUMAtriptan, desvenlafaxine, fish oil, and topiramate    Allergies:  No Known Allergies    PSHx:   Past Surgical History:   Procedure Laterality Date    GUM SURGERY      Gum graft    KIDNEY STONE SURGERY  01/12/2021    w ureteral stent placement and removal    MULTIPLE TOOTH EXTRACTIONS      WISDOM TOOTH EXTRACTION         Social History:    reports that she has never smoked. She has never been exposed to tobacco smoke. She has never used smokeless tobacco. She reports that she does not currently use alcohol. She reports that she does not use drugs.    Family History:   Family History   Problem Relation Age of Onset    Hypertension Mother     Polycystic ovary syndrome Paternal Grandmother     Colon cancer Maternal Grandmother     Breast cancer Maternal Grandmother     Lung cancer Maternal Grandmother     Hyperlipidemia Maternal Grandfather     Hypertension Maternal Grandfather     Deep vein thrombosis Maternal Aunt         Mat. Great Aunt    Breast cancer Maternal Aunt     Ovarian cancer Neg Hx     Uterine cancer Neg Hx     Pulmonary embolism Neg Hx        Immunizations: Non contributory to this admission        Objective     Vitals:   Heart Rate:  [120] 120  BP: (135)/(91) 135/91    PHYSICAL EXAM:   General- NAD, alert and oriented, appropriate, appears uncomfortable, able to move without assistance  Psych- Normal mood, good memory  Cardiovascular- Regular rhythm, no murnurs  Respiratory- CTA to bases, no wheezes  Abdomen-Flat, soft, non distended.  No rebound or guarding.  Bilat low pelvis tender.   Pelvic-see prior  Lymphatic-see prior  Rectal- Not examined.     Extremities- No edema, bilaterally equal      Lab/Imaging/Other: Pap NEG 2021    PROCEDURE:  US NON-OB TRANSVAGINAL     COMPARISON: None     INDICATIONS:  pelvic pain     TECHNIQUE:    Ultrasound examination of  the pelvis was performed, using endovaginal technique.       FINDINGS:          UTERUS:           Size is 8.6 x 3.6 x 5.1 cm with unremarkable appearance.  Endometrial thickness is 5 mm.    ADNEXAE:        The right ovary measures 2.1 x 1.9 x 3 cm with a volume of 6 mL.  There are multiple right   ovarian follicles.  The left ovary is 3.3 x 1.7 x 3.2 cm with a volume of 9.2 mL.    CUL-DE-SAC:   Normal.  No fluid or mass.    OTHER:             Negative.       IMPRESSION:               Multiple right ovarian follicles.  Normal left ovary.  Normal uterus.            JERRY DARBY MD         Electronically Signed and Approved By: JERRY DARBY MD on 7/13/2023 at 12:30       Assessment / Plan     Assessment:  Diagnoses and all orders for this visit:    1. Pelvic pain (Primary)  Overview:  July 2023 US wnl except multiple bilat ov follicles  Norethindrone, SE, decreased sex drive  Slynd, SE, decreased sex drive and acne  Refractory to medical management      2. Hx of pulmonary embolus  Overview:  With nuvaring 2018, thrombophilia panel neg  S/p 6mo anticoagulation  Stable on no anticoagulation    Orders:  -     Enoxaparin Sodium (LOVENOX) 40 MG/0.4ML solution prefilled syringe syringe; Inject 0.4 mL under the skin into the appropriate area as directed Daily for 4 days. Start the morning of surgery, 1hr before scheduled arrival to hospital  Dispense: 1.6 mL; Refill: 0    3. Preoperative examination        Plan:   Diagnostic laparoscopy.  Possible removal of one tube and/or ovary.  We discussed today pain is multifactorial.  There is a possibility if this pain is not GYN related that her pain will not be relieved after surgery and she may require more evaluation or another surgery.  I do not recommend removal of both of the ovaries even if endometriosis is found.  We discussed the risk of BSO at the age of 28 outweighs the potential benefit.  She agrees and desires 1 tube and/or ovary to be removed at the time of surgery if  intraoperative findings indicate that that would help her discomfort.  Low risk for bleeding, Caprini score of 5 with 6% risk of venous thromboembolism. Start low molecular weight heparin at least 2 hours preoperatively will continue for 3 days. 40mg SQ  Counseling: Risks and benefits and alternatives of surgery were discussed with patient.  Risks are not limited to anesthesia, bleeding, blood transfusion, infection, damage to surrounding organs, wound separation, re-operation, thromboembolic disease, death.  See separate written and signed consent for surgical specific risks and benefits.    Return in about 4 weeks (around 1/1/2024) for Postop FU.          Signature: Electronically signed by Cristal Grace DO, 12/04/23, 12:46 PM EST.      [facility] [place] [facility]  [facility] [place] [address]  Dept: [phone]  Dept Fax: [phone]  Loc: [phone]  Loc Fax: [phone]

## 2023-12-04 ENCOUNTER — OFFICE VISIT (OUTPATIENT)
Dept: OBSTETRICS AND GYNECOLOGY | Facility: CLINIC | Age: 28
End: 2023-12-04
Payer: COMMERCIAL

## 2023-12-04 VITALS
HEIGHT: 62 IN | BODY MASS INDEX: 28.34 KG/M2 | SYSTOLIC BLOOD PRESSURE: 135 MMHG | DIASTOLIC BLOOD PRESSURE: 91 MMHG | WEIGHT: 154 LBS | HEART RATE: 120 BPM

## 2023-12-04 DIAGNOSIS — R10.2 PELVIC PAIN: Primary | ICD-10-CM

## 2023-12-04 DIAGNOSIS — Z86.711 HX OF PULMONARY EMBOLUS: ICD-10-CM

## 2023-12-04 DIAGNOSIS — Z01.818 PREOPERATIVE EXAMINATION: ICD-10-CM

## 2023-12-04 PROCEDURE — S0260 H&P FOR SURGERY: HCPCS | Performed by: OBSTETRICS & GYNECOLOGY

## 2023-12-04 PROCEDURE — 99214 OFFICE O/P EST MOD 30 MIN: CPT | Performed by: OBSTETRICS & GYNECOLOGY

## 2023-12-04 RX ORDER — SUMATRIPTAN 50 MG/1
50 TABLET, FILM COATED ORAL ONCE AS NEEDED
COMMUNITY
Start: 2023-11-22

## 2023-12-04 RX ORDER — TOPIRAMATE 25 MG/1
25 TABLET ORAL 2 TIMES DAILY
COMMUNITY
Start: 2023-11-22

## 2023-12-04 RX ORDER — CHLORAL HYDRATE 500 MG
1000 CAPSULE ORAL
COMMUNITY

## 2023-12-04 RX ORDER — ENOXAPARIN SODIUM 100 MG/ML
40 INJECTION SUBCUTANEOUS
Qty: 1.6 ML | Refills: 0 | Status: SHIPPED | OUTPATIENT
Start: 2023-12-04 | End: 2023-12-08

## 2023-12-04 NOTE — H&P
GYN HISTORY & PHYSICAL      Patient Name: Ana Brito  : 1995  MRN: [id number]    Subjective     Chief Complaint:   Chief Complaint   Patient presents with    Follow-up     Pre op        HPI:  28 y.o.  Patient's last menstrual period was 10/16/2023 (exact date).  Social History     Substance and Sexual Activity   Sexual Activity Yes    Partners: Male    Birth control/protection: Vasectomy     Office Visit with Cristal Grace DO (2023)    Mid cyclic pelvic pain.  Ultrasound normal except consistent with known polycystic ovarian syndrome.  Slynd started, however caused decreased sex drive and acne.  Pain persists with severe cramps in hips, starts 2 weeks before menses, low in pelvis bilateral left more than right and low back.  Patient desires diagnostic laparoscopy and declines any further medical treatment management options.  No family history of endometriosis.  Past medical history significant for history of PE with NuvaRing in 2018, thrombophilia panel negative.    Today pain worseing, burning, bloating, heavy in pelvis area, bilat low pelvis equal.    ROS: All negative except listed in HPI    Personal History     PMHx:    Past Medical History:   Diagnosis Date    Anxiety     COVID-19 affecting childbirth 2021    Depression     Gestational hypertension     Hypertension     CHRONIC NO MEDS    Major depressive disorder 2021    No meds, cont to monitor    Migraine withOUT aura     PCOS (polycystic ovarian syndrome) 2023    Pulmonary embolism 2018    WHILE USING NUVARING.  THROMBOPHILIA PANEL NEG AND NO FHX ARBEN.       OBHx:   OB History    Para Term  AB Living   2 2 2     2   SAB IAB Ectopic Molar Multiple Live Births             2      # Outcome Date GA Lbr Will/2nd Weight Sex Delivery Anes PTL Lv   2 Term 21 37w3d 11:30 / 00:30 2890 g (6 lb 5.9 oz) M Vag-Spont EPI N BEBETO   1 Term 17 37w1d  2892 g (6 lb 6 oz) F Vag-Spont EPI N BEBETO      Birth  Comments: BP remained elevated postpartum      Complications: Pregnancy-induced hypertension        Medications:  Drospirenone, Enoxaparin Sodium, SUMAtriptan, desvenlafaxine, fish oil, and topiramate    Allergies:  No Known Allergies    PSHx:   Past Surgical History:   Procedure Laterality Date    GUM SURGERY      Gum graft    KIDNEY STONE SURGERY  01/12/2021    w ureteral stent placement and removal    MULTIPLE TOOTH EXTRACTIONS      WISDOM TOOTH EXTRACTION         Social History:    reports that she has never smoked. She has never been exposed to tobacco smoke. She has never used smokeless tobacco. She reports that she does not currently use alcohol. She reports that she does not use drugs.    Family History:   Family History   Problem Relation Age of Onset    Hypertension Mother     Polycystic ovary syndrome Paternal Grandmother     Colon cancer Maternal Grandmother     Breast cancer Maternal Grandmother     Lung cancer Maternal Grandmother     Hyperlipidemia Maternal Grandfather     Hypertension Maternal Grandfather     Deep vein thrombosis Maternal Aunt         Mat. Great Aunt    Breast cancer Maternal Aunt     Ovarian cancer Neg Hx     Uterine cancer Neg Hx     Pulmonary embolism Neg Hx        Immunizations: Non contributory to this admission        Objective     Vitals:   Heart Rate:  [120] 120  BP: (135)/(91) 135/91    PHYSICAL EXAM:   General- NAD, alert and oriented, appropriate, appears uncomfortable, able to move without assistance  Psych- Normal mood, good memory  Cardiovascular- Regular rhythm, no murnurs  Respiratory- CTA to bases, no wheezes  Abdomen-Flat, soft, non distended.  No rebound or guarding.  Bilat low pelvis tender.   Pelvic-see prior  Lymphatic-see prior  Rectal- Not examined.     Extremities- No edema, bilaterally equal      Lab/Imaging/Other: Pap NEG 2021    PROCEDURE:  US NON-OB TRANSVAGINAL     COMPARISON: None     INDICATIONS:  pelvic pain     TECHNIQUE:    Ultrasound examination of  the pelvis was performed, using endovaginal technique.       FINDINGS:          UTERUS:           Size is 8.6 x 3.6 x 5.1 cm with unremarkable appearance.  Endometrial thickness is 5 mm.    ADNEXAE:        The right ovary measures 2.1 x 1.9 x 3 cm with a volume of 6 mL.  There are multiple right   ovarian follicles.  The left ovary is 3.3 x 1.7 x 3.2 cm with a volume of 9.2 mL.    CUL-DE-SAC:   Normal.  No fluid or mass.    OTHER:             Negative.       IMPRESSION:               Multiple right ovarian follicles.  Normal left ovary.  Normal uterus.            JERRY DARBY MD         Electronically Signed and Approved By: JERRY DARBY MD on 7/13/2023 at 12:30       Assessment / Plan     Assessment:  Diagnoses and all orders for this visit:    1. Pelvic pain (Primary)  Overview:  July 2023 US wnl except multiple bilat ov follicles  Norethindrone, SE, decreased sex drive  Slynd, SE, decreased sex drive and acne  Refractory to medical management      2. Hx of pulmonary embolus  Overview:  With nuvaring 2018, thrombophilia panel neg  S/p 6mo anticoagulation  Stable on no anticoagulation    Orders:  -     Enoxaparin Sodium (LOVENOX) 40 MG/0.4ML solution prefilled syringe syringe; Inject 0.4 mL under the skin into the appropriate area as directed Daily for 4 days. Start the morning of surgery, 1hr before scheduled arrival to hospital  Dispense: 1.6 mL; Refill: 0      Plan:   Diagnostic laparoscopy.  Possible removal of one tube and/or ovary.  We discussed today pain is multifactorial.  There is a possibility if this pain is not GYN related that her pain will not be relieved after surgery and she may require more evaluation or another surgery.  I do not recommend removal of both of the ovaries even if endometriosis is found.  We discussed the risk of BSO at the age of 28 outweighs the potential benefit.  She agrees and desires 1 tube and/or ovary to be removed at the time of surgery if intraoperative findings indicate  that that would help her discomfort.  Low risk for bleeding, Caprini score of 5 with 6% risk of venous thromboembolism. Start low molecular weight heparin at least 2 hours preoperatively will continue for 3 days. 40mg SQ  Counseling: Risks and benefits and alternatives of surgery were discussed with patient.  Risks are not limited to anesthesia, bleeding, blood transfusion, infection, damage to surrounding organs, wound separation, re-operation, thromboembolic disease, death.  See separate written and signed consent for surgical specific risks and benefits.            Signature: Electronically signed by Cristal Grace DO, 12/04/23, 12:46 PM EST.      [facility]  [address] [place] [address]  Dept: [phone]  Dept Fax: [phone]  Loc: [phone]  Loc Fax: [phone]

## 2023-12-05 ENCOUNTER — ANESTHESIA EVENT (OUTPATIENT)
Dept: PERIOP | Facility: HOSPITAL | Age: 28
End: 2023-12-05
Payer: COMMERCIAL

## 2023-12-05 RX ORDER — MAGNESIUM OXIDE 400 MG/1
400 TABLET ORAL DAILY
COMMUNITY

## 2023-12-05 NOTE — PRE-PROCEDURE INSTRUCTIONS
PATIENT INSTRUCTED TO BE:    - NOTHING TO EAT AFTER MIDNIGHT OR CHEW, EXCEPT CAN HAVE CLEAR LIQUIDS 2 HOURS PRIOR TO SURGERY ARRIVAL TIME     - TO HOLD ALL VITAMINS, SUPPLEMENTS, NSAIDS FOR ONE WEEK PRIOR TO THEIR SURGICAL PROCEDURE    - DO NOT TAKE __-------------_ 7 DAYS PRIOR TO PROCEDURE PER ANESTHESIA RECOMMENDATIONS/INSTRUCTIONS     - INSTRUCTED PT TO USE SURGICAL SOAP 1 TIME THE NIGHT PRIOR TO SURGERY OR THE AM OF SURGERY.   USE SOAP FROM NECK TO TOES AVOID THEIR FACE, HAIR, AND PRIVATE PARTS. INSTRUCTED NO LOTIONS, JEWELRY, PIERCINGS, OR DEODORANT DAY OF SURGERY    - IF DIABETIC, CHECK BLOOD GLUCOSE IF LESS THAN 70 OR HAVING SYMPTOMS CALL THE PREOP AREA FOR INSTRUCTIONS ON AM OF SURGERY ([phone])    -INSTRUCTED TO TAKE THE FOLLOWING MEDICATIONS THE DAY OF SURGERY:          COLACE, DROSPIRENONE, MAGNESIUM, PRISTIQ, TOPMAX AND IMITREX         - DO NOT BRING ANY MEDICATIONS WITH YOU TO THE HOSPITAL THE DAY OF SURGERY, EXCEPT IF USE INHALERS. BRING INHALERS DAY OF SURGERY       - BRING CPAP OR BIPAP TO THE HOSPITAL ONLY IF ARE SPENDING THE NIGHT    - DO NOT SMOKE OR VAPE 24 HOURS PRIOR TO PROCEDURE PER ANESTHESIA REQUEST     -MAKE SURE YOU HAVE A RIDE HOME OR SOMEONE TO STAY WITH YOU THE DAY OF THE PROCEDURE AFTER YOU GO HOME    - FOLLOW ANY OTHER INSTRUCTIONS GIVEN TO YOU BY YOUR SURGEON'S OFFICE.     - PREADMISSION TESTING NURSE- KATE KHANNA [phone] IF HAVE ANY QUESTIONS     PATIENT PROVIDED THE NUMBER FOR PREOP SURGICAL DEPT IF HAD QUESTIONS AFTER HOURS PRIOR TO SURGERY ([phone])  INFORMED PT IF NO ANSWER, LEAVE A MESSAGE AND SOMEONE WILL RETURN THEIR CALL       PATIENT VERBALIZED UNDERSTANDING

## 2023-12-06 ENCOUNTER — HOSPITAL ENCOUNTER (OUTPATIENT)
Facility: HOSPITAL | Age: 28
Setting detail: HOSPITAL OUTPATIENT SURGERY
Discharge: HOME | End: 2023-12-06
Attending: OBSTETRICS & GYNECOLOGY | Admitting: OBSTETRICS & GYNECOLOGY
Payer: COMMERCIAL

## 2023-12-06 ENCOUNTER — ANESTHESIA (OUTPATIENT)
Dept: PERIOP | Facility: HOSPITAL | Age: 28
End: 2023-12-06
Payer: COMMERCIAL

## 2023-12-06 VITALS
WEIGHT: 156.97 LBS | SYSTOLIC BLOOD PRESSURE: 105 MMHG | DIASTOLIC BLOOD PRESSURE: 78 MMHG | OXYGEN SATURATION: 99 % | HEIGHT: 62 IN | BODY MASS INDEX: 28.89 KG/M2 | RESPIRATION RATE: 20 BRPM | TEMPERATURE: 97.4 F | HEART RATE: 65 BPM

## 2023-12-06 DIAGNOSIS — R10.2 PELVIC PAIN: ICD-10-CM

## 2023-12-06 DIAGNOSIS — N80.9 ENDOMETRIOSIS DETERMINED BY LAPAROSCOPY: ICD-10-CM

## 2023-12-06 DIAGNOSIS — N94.89 PELVIC CONGESTION: Primary | ICD-10-CM

## 2023-12-06 PROBLEM — D72.819 LEUKOPENIA: Status: ACTIVE | Noted: 2023-12-06

## 2023-12-06 LAB
ABO GROUP BLD: NORMAL
BASOPHILS # BLD AUTO: 0.02 10*3/MM3 (ref 0–0.2)
BASOPHILS NFR BLD AUTO: 0.6 % (ref 0–1.5)
BLD GP AB SCN SERPL QL: NEGATIVE
DEPRECATED RDW RBC AUTO: 41.1 FL (ref 37–54)
EOSINOPHIL # BLD AUTO: 0 10*3/MM3 (ref 0–0.4)
EOSINOPHIL NFR BLD AUTO: 0 % (ref 0.3–6.2)
ERYTHROCYTE [DISTWIDTH] IN BLOOD BY AUTOMATED COUNT: 13.3 % (ref 12.3–15.4)
HCG INTACT+B SERPL-ACNC: <0.5 MIU/ML
HCT VFR BLD AUTO: 40.2 % (ref 34–46.6)
HGB BLD-MCNC: 13.6 G/DL (ref 12–15.9)
IMM GRANULOCYTES # BLD AUTO: 0 10*3/MM3 (ref 0–0.05)
IMM GRANULOCYTES NFR BLD AUTO: 0 % (ref 0–0.5)
LYMPHOCYTES # BLD AUTO: 1.22 10*3/MM3 (ref 0.7–3.1)
LYMPHOCYTES NFR BLD AUTO: 36.7 % (ref 19.6–45.3)
MCH RBC QN AUTO: 29.1 PG (ref 26.6–33)
MCHC RBC AUTO-ENTMCNC: 33.8 G/DL (ref 31.5–35.7)
MCV RBC AUTO: 86.1 FL (ref 79–97)
MONOCYTES # BLD AUTO: 0.3 10*3/MM3 (ref 0.1–0.9)
MONOCYTES NFR BLD AUTO: 9 % (ref 5–12)
NEUTROPHILS NFR BLD AUTO: 1.78 10*3/MM3 (ref 1.7–7)
NEUTROPHILS NFR BLD AUTO: 53.7 % (ref 42.7–76)
NRBC BLD AUTO-RTO: 0 /100 WBC (ref 0–0.2)
PLATELET # BLD AUTO: 236 10*3/MM3 (ref 140–450)
PMV BLD AUTO: 10.3 FL (ref 6–12)
QT INTERVAL: 356 MS
QTC INTERVAL: 409 MS
RBC # BLD AUTO: 4.67 10*6/MM3 (ref 3.77–5.28)
RH BLD: NEGATIVE
T&S EXPIRATION DATE: NORMAL
WBC NRBC COR # BLD AUTO: 3.32 10*3/MM3 (ref 3.4–10.8)

## 2023-12-06 PROCEDURE — 86901 BLOOD TYPING SEROLOGIC RH(D): CPT | Performed by: OBSTETRICS & GYNECOLOGY

## 2023-12-06 PROCEDURE — 25010000002 MIDAZOLAM PER 1MG: Performed by: ANESTHESIOLOGY

## 2023-12-06 PROCEDURE — 25010000002 PROPOFOL 10 MG/ML EMULSION: Performed by: NURSE ANESTHETIST, CERTIFIED REGISTERED

## 2023-12-06 PROCEDURE — 86850 RBC ANTIBODY SCREEN: CPT | Performed by: OBSTETRICS & GYNECOLOGY

## 2023-12-06 PROCEDURE — 58662 LAPAROSCOPY EXCISE LESIONS: CPT

## 2023-12-06 PROCEDURE — 93005 ELECTROCARDIOGRAM TRACING: CPT | Performed by: ANESTHESIOLOGY

## 2023-12-06 PROCEDURE — 25810000003 LACTATED RINGERS PER 1000 ML: Performed by: OBSTETRICS & GYNECOLOGY

## 2023-12-06 PROCEDURE — 85025 COMPLETE CBC W/AUTO DIFF WBC: CPT | Performed by: OBSTETRICS & GYNECOLOGY

## 2023-12-06 PROCEDURE — 84702 CHORIONIC GONADOTROPIN TEST: CPT | Performed by: OBSTETRICS & GYNECOLOGY

## 2023-12-06 PROCEDURE — 86900 BLOOD TYPING SEROLOGIC ABO: CPT | Performed by: OBSTETRICS & GYNECOLOGY

## 2023-12-06 PROCEDURE — 25010000002 BUPIVACAINE (PF) 0.25 % SOLUTION: Performed by: OBSTETRICS & GYNECOLOGY

## 2023-12-06 PROCEDURE — 25010000002 KETOROLAC TROMETHAMINE PER 15 MG: Performed by: NURSE ANESTHETIST, CERTIFIED REGISTERED

## 2023-12-06 PROCEDURE — 25010000002 FENTANYL CITRATE (PF) 50 MCG/ML SOLUTION: Performed by: NURSE ANESTHETIST, CERTIFIED REGISTERED

## 2023-12-06 PROCEDURE — 25010000002 SUGAMMADEX 200 MG/2ML SOLUTION: Performed by: NURSE ANESTHETIST, CERTIFIED REGISTERED

## 2023-12-06 PROCEDURE — 25010000002 HYDROMORPHONE 1 MG/ML SOLUTION: Performed by: NURSE ANESTHETIST, CERTIFIED REGISTERED

## 2023-12-06 PROCEDURE — 25010000002 DEXAMETHASONE PER 1 MG: Performed by: NURSE ANESTHETIST, CERTIFIED REGISTERED

## 2023-12-06 PROCEDURE — 25010000002 ENOXAPARIN PER 10 MG: Performed by: OBSTETRICS & GYNECOLOGY

## 2023-12-06 PROCEDURE — 25010000002 ONDANSETRON PER 1 MG: Performed by: NURSE ANESTHETIST, CERTIFIED REGISTERED

## 2023-12-06 PROCEDURE — 25010000002 MEPERIDINE PER 100 MG: Performed by: NURSE ANESTHETIST, CERTIFIED REGISTERED

## 2023-12-06 PROCEDURE — 58662 LAPAROSCOPY EXCISE LESIONS: CPT | Performed by: OBSTETRICS & GYNECOLOGY

## 2023-12-06 RX ORDER — ACETAMINOPHEN 500 MG
1000 TABLET ORAL ONCE
Status: COMPLETED | OUTPATIENT
Start: 2023-12-06 | End: 2023-12-06

## 2023-12-06 RX ORDER — SODIUM CHLORIDE, SODIUM LACTATE, POTASSIUM CHLORIDE, CALCIUM CHLORIDE 600; 310; 30; 20 MG/100ML; MG/100ML; MG/100ML; MG/100ML
9 INJECTION, SOLUTION INTRAVENOUS CONTINUOUS PRN
Status: DISCONTINUED | OUTPATIENT
Start: 2023-12-06 | End: 2023-12-06 | Stop reason: HOSPADM

## 2023-12-06 RX ORDER — ONDANSETRON 2 MG/ML
4 INJECTION INTRAMUSCULAR; INTRAVENOUS EVERY 6 HOURS PRN
Status: DISCONTINUED | OUTPATIENT
Start: 2023-12-06 | End: 2023-12-06 | Stop reason: HOSPADM

## 2023-12-06 RX ORDER — LIDOCAINE HYDROCHLORIDE 20 MG/ML
INJECTION, SOLUTION EPIDURAL; INFILTRATION; INTRACAUDAL; PERINEURAL AS NEEDED
Status: DISCONTINUED | OUTPATIENT
Start: 2023-12-06 | End: 2023-12-06 | Stop reason: SURG

## 2023-12-06 RX ORDER — BUPIVACAINE HYDROCHLORIDE 2.5 MG/ML
INJECTION, SOLUTION EPIDURAL; INFILTRATION; INTRACAUDAL AS NEEDED
Status: DISCONTINUED | OUTPATIENT
Start: 2023-12-06 | End: 2023-12-06 | Stop reason: HOSPADM

## 2023-12-06 RX ORDER — ONDANSETRON 4 MG/1
4 TABLET, ORALLY DISINTEGRATING ORAL EVERY 8 HOURS PRN
Qty: 30 TABLET | Refills: 1 | Status: SHIPPED | OUTPATIENT
Start: 2023-12-06

## 2023-12-06 RX ORDER — DEXMEDETOMIDINE HYDROCHLORIDE 100 UG/ML
INJECTION, SOLUTION INTRAVENOUS AS NEEDED
Status: DISCONTINUED | OUTPATIENT
Start: 2023-12-06 | End: 2023-12-06 | Stop reason: SURG

## 2023-12-06 RX ORDER — SODIUM CHLORIDE 0.9 % (FLUSH) 0.9 %
3 SYRINGE (ML) INJECTION EVERY 12 HOURS SCHEDULED
Status: DISCONTINUED | OUTPATIENT
Start: 2023-12-06 | End: 2023-12-06 | Stop reason: HOSPADM

## 2023-12-06 RX ORDER — SODIUM CHLORIDE 9 MG/ML
40 INJECTION, SOLUTION INTRAVENOUS AS NEEDED
Status: DISCONTINUED | OUTPATIENT
Start: 2023-12-06 | End: 2023-12-06 | Stop reason: HOSPADM

## 2023-12-06 RX ORDER — TRAMADOL HYDROCHLORIDE 50 MG/1
50 TABLET ORAL ONCE AS NEEDED
Status: DISCONTINUED | OUTPATIENT
Start: 2023-12-06 | End: 2023-12-06 | Stop reason: HOSPADM

## 2023-12-06 RX ORDER — IBUPROFEN 600 MG/1
600 TABLET ORAL EVERY 6 HOURS PRN
Qty: 30 TABLET | Refills: 1 | Status: SHIPPED | OUTPATIENT
Start: 2023-12-06

## 2023-12-06 RX ORDER — MAGNESIUM HYDROXIDE 1200 MG/15ML
LIQUID ORAL AS NEEDED
Status: DISCONTINUED | OUTPATIENT
Start: 2023-12-06 | End: 2023-12-06 | Stop reason: HOSPADM

## 2023-12-06 RX ORDER — ACETAMINOPHEN 325 MG/1
650 TABLET ORAL ONCE
Status: DISCONTINUED | OUTPATIENT
Start: 2023-12-06 | End: 2023-12-06 | Stop reason: HOSPADM

## 2023-12-06 RX ORDER — SODIUM CHLORIDE, SODIUM LACTATE, POTASSIUM CHLORIDE, CALCIUM CHLORIDE 600; 310; 30; 20 MG/100ML; MG/100ML; MG/100ML; MG/100ML
150 INJECTION, SOLUTION INTRAVENOUS CONTINUOUS
Status: DISCONTINUED | OUTPATIENT
Start: 2023-12-06 | End: 2023-12-06 | Stop reason: HOSPADM

## 2023-12-06 RX ORDER — ONDANSETRON 2 MG/ML
4 INJECTION INTRAMUSCULAR; INTRAVENOUS ONCE AS NEEDED
Status: DISCONTINUED | OUTPATIENT
Start: 2023-12-06 | End: 2023-12-06 | Stop reason: HOSPADM

## 2023-12-06 RX ORDER — OXYCODONE HYDROCHLORIDE AND ACETAMINOPHEN 5; 325 MG/1; MG/1
1 TABLET ORAL ONCE AS NEEDED
Status: DISCONTINUED | OUTPATIENT
Start: 2023-12-06 | End: 2023-12-06 | Stop reason: HOSPADM

## 2023-12-06 RX ORDER — PROMETHAZINE HYDROCHLORIDE 12.5 MG/1
25 TABLET ORAL ONCE AS NEEDED
Status: DISCONTINUED | OUTPATIENT
Start: 2023-12-06 | End: 2023-12-06 | Stop reason: HOSPADM

## 2023-12-06 RX ORDER — ONDANSETRON 2 MG/ML
INJECTION INTRAMUSCULAR; INTRAVENOUS AS NEEDED
Status: DISCONTINUED | OUTPATIENT
Start: 2023-12-06 | End: 2023-12-06 | Stop reason: SURG

## 2023-12-06 RX ORDER — PROMETHAZINE HYDROCHLORIDE 12.5 MG/1
12.5 TABLET ORAL ONCE AS NEEDED
Status: DISCONTINUED | OUTPATIENT
Start: 2023-12-06 | End: 2023-12-06 | Stop reason: HOSPADM

## 2023-12-06 RX ORDER — PROPOFOL 10 MG/ML
VIAL (ML) INTRAVENOUS AS NEEDED
Status: DISCONTINUED | OUTPATIENT
Start: 2023-12-06 | End: 2023-12-06 | Stop reason: SURG

## 2023-12-06 RX ORDER — ACETAMINOPHEN 325 MG/1
650 TABLET ORAL EVERY 4 HOURS PRN
Qty: 30 TABLET | Refills: 1 | Status: SHIPPED | OUTPATIENT
Start: 2023-12-06

## 2023-12-06 RX ORDER — FENTANYL CITRATE 50 UG/ML
INJECTION, SOLUTION INTRAMUSCULAR; INTRAVENOUS AS NEEDED
Status: DISCONTINUED | OUTPATIENT
Start: 2023-12-06 | End: 2023-12-06 | Stop reason: SURG

## 2023-12-06 RX ORDER — PHENYLEPHRINE HCL IN 0.9% NACL 1 MG/10 ML
SYRINGE (ML) INTRAVENOUS AS NEEDED
Status: DISCONTINUED | OUTPATIENT
Start: 2023-12-06 | End: 2023-12-06 | Stop reason: SURG

## 2023-12-06 RX ORDER — IBUPROFEN 600 MG/1
600 TABLET ORAL EVERY 6 HOURS PRN
Status: DISCONTINUED | OUTPATIENT
Start: 2023-12-06 | End: 2023-12-06 | Stop reason: HOSPADM

## 2023-12-06 RX ORDER — KETOROLAC TROMETHAMINE 30 MG/ML
INJECTION, SOLUTION INTRAMUSCULAR; INTRAVENOUS AS NEEDED
Status: DISCONTINUED | OUTPATIENT
Start: 2023-12-06 | End: 2023-12-06 | Stop reason: SURG

## 2023-12-06 RX ORDER — SODIUM CHLORIDE 0.9 % (FLUSH) 0.9 %
10 SYRINGE (ML) INJECTION AS NEEDED
Status: DISCONTINUED | OUTPATIENT
Start: 2023-12-06 | End: 2023-12-06 | Stop reason: HOSPADM

## 2023-12-06 RX ORDER — MIDAZOLAM HYDROCHLORIDE 2 MG/2ML
2 INJECTION, SOLUTION INTRAMUSCULAR; INTRAVENOUS ONCE
Status: COMPLETED | OUTPATIENT
Start: 2023-12-06 | End: 2023-12-06

## 2023-12-06 RX ORDER — MEPERIDINE HYDROCHLORIDE 25 MG/ML
12.5 INJECTION INTRAMUSCULAR; INTRAVENOUS; SUBCUTANEOUS
Status: DISCONTINUED | OUTPATIENT
Start: 2023-12-06 | End: 2023-12-06 | Stop reason: HOSPADM

## 2023-12-06 RX ORDER — OXYCODONE HYDROCHLORIDE 5 MG/1
5 TABLET ORAL
Status: DISCONTINUED | OUTPATIENT
Start: 2023-12-06 | End: 2023-12-06 | Stop reason: HOSPADM

## 2023-12-06 RX ORDER — OXYCODONE HYDROCHLORIDE 5 MG/1
2.5 TABLET ORAL EVERY 4 HOURS PRN
Qty: 8 TABLET | Refills: 0 | Status: SHIPPED | OUTPATIENT
Start: 2023-12-06

## 2023-12-06 RX ORDER — DEXAMETHASONE SODIUM PHOSPHATE 4 MG/ML
INJECTION, SOLUTION INTRA-ARTICULAR; INTRALESIONAL; INTRAMUSCULAR; INTRAVENOUS; SOFT TISSUE AS NEEDED
Status: DISCONTINUED | OUTPATIENT
Start: 2023-12-06 | End: 2023-12-06 | Stop reason: SURG

## 2023-12-06 RX ORDER — ENOXAPARIN SODIUM 100 MG/ML
40 INJECTION SUBCUTANEOUS ONCE
Status: COMPLETED | OUTPATIENT
Start: 2023-12-06 | End: 2023-12-06

## 2023-12-06 RX ORDER — ROCURONIUM BROMIDE 10 MG/ML
INJECTION, SOLUTION INTRAVENOUS AS NEEDED
Status: DISCONTINUED | OUTPATIENT
Start: 2023-12-06 | End: 2023-12-06 | Stop reason: SURG

## 2023-12-06 RX ORDER — PROMETHAZINE HYDROCHLORIDE 25 MG/1
25 SUPPOSITORY RECTAL ONCE AS NEEDED
Status: DISCONTINUED | OUTPATIENT
Start: 2023-12-06 | End: 2023-12-06 | Stop reason: HOSPADM

## 2023-12-06 RX ADMIN — DEXMEDETOMIDINE 4 MCG: 100 INJECTION, SOLUTION, CONCENTRATE INTRAVENOUS at 11:47

## 2023-12-06 RX ADMIN — FENTANYL CITRATE 50 MCG: 50 INJECTION, SOLUTION INTRAMUSCULAR; INTRAVENOUS at 11:19

## 2023-12-06 RX ADMIN — Medication 100 MCG: at 11:56

## 2023-12-06 RX ADMIN — ONDANSETRON 4 MG: 2 INJECTION INTRAMUSCULAR; INTRAVENOUS at 11:41

## 2023-12-06 RX ADMIN — FENTANYL CITRATE 50 MCG: 50 INJECTION, SOLUTION INTRAMUSCULAR; INTRAVENOUS at 11:29

## 2023-12-06 RX ADMIN — SODIUM CHLORIDE, POTASSIUM CHLORIDE, SODIUM LACTATE AND CALCIUM CHLORIDE: 600; 310; 30; 20 INJECTION, SOLUTION INTRAVENOUS at 12:22

## 2023-12-06 RX ADMIN — DEXAMETHASONE SODIUM PHOSPHATE 4 MG: 4 INJECTION, SOLUTION INTRAMUSCULAR; INTRAVENOUS at 11:32

## 2023-12-06 RX ADMIN — SODIUM CHLORIDE, POTASSIUM CHLORIDE, SODIUM LACTATE AND CALCIUM CHLORIDE 150 ML/HR: 600; 310; 30; 20 INJECTION, SOLUTION INTRAVENOUS at 08:45

## 2023-12-06 RX ADMIN — Medication 100 MCG: at 11:29

## 2023-12-06 RX ADMIN — LIDOCAINE HYDROCHLORIDE 80 MG: 20 INJECTION, SOLUTION EPIDURAL; INFILTRATION; INTRACAUDAL; PERINEURAL at 11:19

## 2023-12-06 RX ADMIN — ROCURONIUM BROMIDE 40 MG: 50 INJECTION INTRAVENOUS at 11:22

## 2023-12-06 RX ADMIN — MEPERIDINE HYDROCHLORIDE 12.5 MG: 25 INJECTION INTRAMUSCULAR; INTRAVENOUS; SUBCUTANEOUS at 12:32

## 2023-12-06 RX ADMIN — MIDAZOLAM HYDROCHLORIDE 2 MG: 1 INJECTION, SOLUTION INTRAMUSCULAR; INTRAVENOUS at 10:49

## 2023-12-06 RX ADMIN — Medication 100 MCG: at 12:10

## 2023-12-06 RX ADMIN — SUGAMMADEX 150 MG: 100 INJECTION, SOLUTION INTRAVENOUS at 12:18

## 2023-12-06 RX ADMIN — PROPOFOL 170 MG: 10 INJECTION, EMULSION INTRAVENOUS at 11:22

## 2023-12-06 RX ADMIN — HYDROMORPHONE HYDROCHLORIDE 0.5 MG: 1 INJECTION, SOLUTION INTRAMUSCULAR; INTRAVENOUS; SUBCUTANEOUS at 12:59

## 2023-12-06 RX ADMIN — DEXMEDETOMIDINE 4 MCG: 100 INJECTION, SOLUTION, CONCENTRATE INTRAVENOUS at 11:39

## 2023-12-06 RX ADMIN — ACETAMINOPHEN 1000 MG: 500 TABLET ORAL at 09:20

## 2023-12-06 RX ADMIN — DEXMEDETOMIDINE 4 MCG: 100 INJECTION, SOLUTION, CONCENTRATE INTRAVENOUS at 11:56

## 2023-12-06 RX ADMIN — KETOROLAC TROMETHAMINE 15 MG: 30 INJECTION, SOLUTION INTRAMUSCULAR; INTRAVENOUS at 12:07

## 2023-12-06 RX ADMIN — ENOXAPARIN SODIUM 40 MG: 100 INJECTION SUBCUTANEOUS at 08:44

## 2023-12-06 NOTE — ANESTHESIA POSTPROCEDURE EVALUATION
Patient: Ana Brito    Procedure Summary       Date: 12/06/23 Room / Location: [facility]  /  [facility]    Anesthesia Start: 1115 Anesthesia Stop: 1228    Procedure: DIAGNOSTIC LAPAROSCOPY, fulgeration of endometriosis (Abdomen) Diagnosis:       Pelvic pain      (Pelvic pain [R10.2])    Surgeons: Cristal Grace DO Provider: Jamarcus Choi MD    Anesthesia Type: general ASA Status: 2            Anesthesia Type: general    Vitals  Vitals Value Taken Time   /73 12/06/23 1317   Temp 36.3 °C (97.4 °F) 12/06/23 1225   Pulse 86 12/06/23 1323   Resp 20 12/06/23 1225   SpO2 100 % 12/06/23 1323   Vitals shown include unfiled device data.        Post Anesthesia Care and Evaluation    Patient location during evaluation: PACU  Patient participation: complete - patient participated  Level of consciousness: awake  Pain management: adequate    Airway patency: patent  Anesthetic complications: No anesthetic complications  PONV Status: none  Cardiovascular status: acceptable and stable  Respiratory status: acceptable  Hydration status: acceptable    Comments: An Anesthesiologist personally participated in the most demanding procedures (including induction and emergence if applicable) in the anesthesia plan, monitored the course of anesthesia administration at frequent intervals and remained physically present and available for immediate diagnosis and treatment of emergencies.

## 2023-12-06 NOTE — OP NOTE
GYN Operative Note      Date of Service:  12/06/23     Pre-Operative Dx:   Pelvic pain [R10.2]     Postoperative dx:   Same as above  Endometriosis  Pelvic congestion    Procedure(s):  Operative laparoscopy with fulguration of endometriosis and lysis of adhesions    Surgeon:  Cristal Grace DO    Assistant:  Jenni G[facility] Assist was responsible for performing the following activities:   Placement of perea and uterine instruments, Manipulation of the uterus, Held/Positioned Camera, Closure, Placement of dressings and their skilled assistance was necessary for the success of this case.     No resident assist available.    Anesthesia:  General Endotracheal (GETA)  CRNA: Tk Quiroz CRNA    Estimated Blood Loss: 0 Mls    Findings:   Pelvic congestion significant bilateral infundibulopelvic ligaments    Blue-black endometriotic lesion 2 mm in size left pelvic sidewall underneath the ovary.  Another 1 mm brownish lesion superior to the 2 mm lesion.  Both above the ureter.  Omentum adhesed to the left ovarian fossa immediately over the course of the ureter.  1 mm cluster of endometriosis in the posterior cul-de-sac underneath the right uterosacral ligament and lateral to the rectal reflection.    Omental adhesions to the left infundibulopelvic ligament.    Normal uterus, normal fallopian tubes, normal ovaries, normal anterior cul-de-sac.  Normal right ovarian fossa.  Normal appendix, liver edge, gallbladder.    Specimens:  None    Procedure Details:  The patient was taken to the operating room where general anesthesia was placed without difficulty.  She was prepped and draped in a normal sterile fashion and placed in low lithotomy position and then I was called into the room.  A Perea catheter was placed sterilely.  An exam under anesthesia was performed.  A speculum was placed in the vagina and a single-tooth tenaculum was used to grasp the anterior lip of the cervix.  The uterus sounded  to 8 cm.  Uterine manipulator was placed.  Attention was then turned to the abdomen.     Outer gloves were removed and new gloves were placed.  All incision sites were pre-injected with local plain anesthetic.  A 5 mm incision was made in the umbilicus.  The anterior abdominal wall was tented up.  A Veress needle was placed easily into the intra-abdominal cavity.  Intra-abdominal confirmation was noted with a positive saline drop test and low intra-abdominal pressures.  CO2 pneumoperitoneum was inflated.  An Opti-Slava non-bladed trocar was placed intra-abdominally after visualizing all tissue planes.  Entry into the intra-abdominal cavity was confirmed.  In a similar fashion left 5 mm and right 8 mm pelvic ports were placed under direct visualization.  A full evaluation of the abdomen and pelvis was performed with findings as above.  There was no noted damage to underlying bowel, bladder, blood vessels, or organs.  The LigaSure advanced was then used to lyse the adhesions of the omentum to the left infundibulopelvic ligament.  The ureter course was delineated on the left.  Lysis of adhesions of omentum over that course was performed as long as it was away from the ureter.  This was limited secondary to the course of the ureter, complete removal of omental adhesions over the ureter were not performed.  Both left sided ovarian fossa endometriotic lesions were cauterized with the LigaSure advanced.  The posterior cul-de-sac endometriosis was also cauterized with LigaSure advanced.     CO2 pneumoperitoneum was deflated.  All trocars were removed. The skin was closed with 4-0 Monocryl in a simple interrupted fashion. Steri-Strips and bandages were placed.  Urine was clear at the end of the procedure.     The patient tolerated the procedure well.  Instrument, lap, and needle counts were correct.  She was taken to the recovery room in stable condition.        Complications:  None    Condition:  Good    Disposition:   PACU      Electronically signed by:  Cristal Grace DO, 12/06/23, 12:20 PM EST.

## 2023-12-06 NOTE — DISCHARGE INSTRUCTIONS
DR. BRANTLEY'S POST OPERATIVE GYN SURGERY PRECAUTIONS AND Answers to FAQS     NO SEX or tampons for ONE week.     NO DRIVING for ONE day and while taking narcotic pain medication.     NO TUB BATH or POOL for TWO weeks, shower only.       NO LIFTING more than 20 pounds for 1 week.     INCISION CARE:   WASH DAILY in the shower with soap and water (any type of soap is fine, it does not need to be antibacterial soap).  Look (or have a family member/friend look) at your incision EVERY DAY when you get home.  Keeping the incision/s DRY is extremely important.       If you have an incision in your belly button, use a Qtip to wash daily, then assure to dry it well.  Blow dry it if needed.     ~~~REMOVE BANDAIDS IN 24HRS.  You may SHOWER after your bandaids are removed.  ~~~REMOVE YOUR STERI STRIPS in TEN DAYS.          REDNESS, PUS, increase in PAIN, FEVER or CHILLS are all reasons to be seen in our office immediately.  Go to the ER, if it is after hours or a weekend.                             VAGINAL BLEEDING/SPOTTING may continue on and off over the next several weeks after surgery.  Pink or even a small amount of bright red in your underwear, on a liner, or on the toilet paper is normal.  You should not be needing to change a pad frequently and it should not be heavy.  If you are not sure, it is persisting, or it is increasing, please call our office.     FEVER or CHILLS or NOT FEELING WELL: call our office.  If the office is closed, you need to be seen in acute care or ER.       SWELLING/EDEMA:  should slowly improve after surgery.  Your legs/ankles should be fairly similar in size.  A red, painful, hot, swollen leg (usually just one side) can be a sign of a blood clot and should be evaluated immediately.  Call our office.  If it is after hours or a weekend, you must be seen IMMEDIATELY IN THE ER.      WORK and SCHOOL TIME OFF: depends on your specific surgery type, surrounding circumstances, and your work  insurance/school rules.  If you have questions, please call [employer] or [place] at [phone] (ext. [phone]).  Or email [employer] at [email or web address].  They will assist in required paperwork for you and/or family members.      Any other QUESTIONS or CONCERNS, please call [facility] DUSTY Redmond at [phone].

## 2023-12-06 NOTE — ANESTHESIA PREPROCEDURE EVALUATION
Anesthesia Evaluation     Patient summary reviewed and Nursing notes reviewed   no history of anesthetic complications:   NPO Solid Status: > 8 hours  NPO Liquid Status: > 2 hours           Airway   Mallampati: II  TM distance: >3 FB  Neck ROM: full  No difficulty expected  Dental      Comment: No loose teeth per pt    Pulmonary     breath sounds clear to auscultation  (+) pulmonary embolism (h/o-resolved),  Cardiovascular   Exercise tolerance: good (4-7 METS)    Rhythm: regular  Rate: normal    (+) hypertension, hyperlipidemia      Neuro/Psych  (+) headaches, psychiatric history Anxiety and Depression  GI/Hepatic/Renal/Endo - negative ROS     Musculoskeletal (-) negative ROS    Abdominal    Substance History - negative use     OB/GYN negative ob/gyn ROS         Other - negative ROS       ROS/Med Hx Other: PCOS   PAT Nursing Notes unavailable.                Anesthesia Plan    ASA 2     general     (Patient understands anesthesia not responsible for dental damage.)  intravenous induction     Anesthetic plan, risks, benefits, and alternatives have been provided, discussed and informed consent has been obtained with: patient.    Plan discussed with CRNA.    CODE STATUS:

## 2023-12-19 ENCOUNTER — TELEMEDICINE (OUTPATIENT)
Dept: FAMILY MEDICINE CLINIC | Facility: TELEHEALTH | Age: 28
End: 2023-12-19
Payer: COMMERCIAL

## 2023-12-19 VITALS — TEMPERATURE: 98.9 F | HEART RATE: 75 BPM

## 2023-12-19 DIAGNOSIS — J06.9 UPPER RESPIRATORY TRACT INFECTION, UNSPECIFIED TYPE: Primary | ICD-10-CM

## 2023-12-19 RX ORDER — PSEUDOEPHEDRINE HYDROCHLORIDE 60 MG/1
60 TABLET, FILM COATED ORAL EVERY 4 HOURS PRN
Qty: 24 TABLET | Refills: 0 | Status: SHIPPED | OUTPATIENT
Start: 2023-12-19

## 2023-12-19 RX ORDER — COVID-19 ANTIGEN TEST
1 KIT MISCELLANEOUS ONCE
Qty: 1 KIT | Refills: 0 | Status: SHIPPED | OUTPATIENT
Start: 2023-12-19 | End: 2023-12-19

## 2023-12-19 RX ORDER — PREDNISONE 20 MG/1
TABLET ORAL
Qty: 10 TABLET | Refills: 0 | Status: SHIPPED | OUTPATIENT
Start: 2023-12-19

## 2023-12-19 NOTE — PROGRESS NOTES
You have chosen to receive care through a telehealth visit.  Do you consent to use a video/audio connection for your medical care today? Yes     HPI  Ana Brito is a 28 y.o. female  presents with complaint of 7 days of sinus and nasal congestion. She has a lot of head pressure and ear pressure bilaterally. Post nasal drainge noted with a dry like throat. She has been having sinus headaches and she had a migraine today and took Imitrex which made her feel flu like. She is not taking anything for head and sinus congestion.     Review of Systems   Constitutional: Negative.    HENT:  Positive for congestion, ear pain (ear fullness), sinus pain and sore throat (throat dryness). Negative for trouble swallowing and voice change.    Respiratory: Negative.     Cardiovascular: Negative.    Gastrointestinal: Negative.    Neurological:  Positive for headaches.   Hematological: Negative.    Psychiatric/Behavioral: Negative.         Past Medical History:   Diagnosis Date    Anxiety     COVID-19 affecting childbirth 08/26/2021    Depression     Endometriosis determined by laparoscopy 12/6/2023    Gestational hypertension     Hypertension     CHRONIC NO MEDS- SEEN DR COYLE WHEN PREGNANT BUT NOT SEEN SINCE THEN AND ON NO MEDS    Major depressive disorder 09/08/2021    No meds, cont to monitor    Migraine withOUT aura     PCOS (polycystic ovarian syndrome) 06/14/2023    Pulmonary embolism 2018    WHILE USING NUVARING.  THROMBOPHILIA PANEL NEG AND NO FHX ARBEN.       Family History   Problem Relation Age of Onset    Hypertension Mother     Polycystic ovary syndrome Paternal Grandmother     Colon cancer Maternal Grandmother     Breast cancer Maternal Grandmother     Lung cancer Maternal Grandmother     Hyperlipidemia Maternal Grandfather     Hypertension Maternal Grandfather     Deep vein thrombosis Maternal Aunt         Mat. Great Aunt    Breast cancer Maternal Aunt     Ovarian cancer Neg Hx     Uterine cancer Neg Hx      Pulmonary embolism Neg Hx        Social History     Socioeconomic History    Marital status:     Number of children: 2   Tobacco Use    Smoking status: Never     Passive exposure: Never    Smokeless tobacco: Never   Vaping Use    Vaping Use: Never used   Substance and Sexual Activity    Alcohol use: Not Currently    Drug use: Never    Sexual activity: Defer     Partners: Male     Birth control/protection: Vasectomy         Pulse 75   Temp 98.9 °F (37.2 °C)   LMP 11/12/2023 (Exact Date)     PHYSICAL EXAM  Physical Exam   Constitutional: She is oriented to person, place, and time. She appears well-developed and well-nourished. She does not have a sickly appearance. She does not appear ill. No distress.   HENT:   Head: Normocephalic and atraumatic.   Right Ear: Hearing normal.   Left Ear: Hearing normal.   Nose: Nose normal.   Mouth/Throat: Mouth/Lips are normal.  Oral pharynx red without petechial redness or exudate. Ear are clear without erythremia    Pulmonary/Chest: Effort normal.  No respiratory distress.  Neurological: She is alert and oriented to person, place, and time.   Psychiatric: She has a normal mood and affect.   Vitals reviewed.      Diagnoses and all orders for this visit:    1. Upper respiratory tract infection, unspecified type (Primary)  -     COVID-19 At Home Antigen Test (BinaxNOW COVID-19 Ag Home Test) kit; 1 kit by In Vitro route 1 (One) Time for 1 dose.  Dispense: 1 kit; Refill: 0  -     pseudoephedrine (SUDAFED) 60 MG tablet; Take 1 tablet by mouth Every 4 (Four) Hours As Needed for Congestion.  Dispense: 24 tablet; Refill: 0  -     predniSONE (DELTASONE) 20 MG tablet; Take 2 tabs po every morning for 5 days.  Dispense: 10 tablet; Refill: 0    Rest and fluids  Start Flonase as directed.            FOLLOW-UP  As discussed during visit with [facility], if symptoms worsen or fail to improve, follow-up with PCP/Urgent Care/Emergency Department.    Patient verbalizes understanding of  medications, instructions for treatment and follow-up.    Tamanna Williamson, APRN  12/19/2023  17:09 EST    The use of a video visit has been reviewed with the patient and verbal informed consent has been obtained. Myself and Ana Brito participated in this visit. The patient is located in [facility], and I am located in [place], [place]. MyChart and Tyto were utilized.

## 2024-01-01 PROBLEM — R10.2 PELVIC PAIN: Status: RESOLVED | Noted: 2023-06-13 | Resolved: 2024-01-01

## 2024-01-03 NOTE — PROGRESS NOTES
"Post Operative Visit        Chief Complaint   Patient presents with    Follow-up     Post op      HPI:   Pain:  yes, some just below belly button, comes and goes, none now.  Preop pain is now minimal, much improved   Vaginal bleeding:  no  Vaginal discharge:  no  Fever/chills:  no  Good appetite:  yes  Normal bladder function:  yes  Normal bowel function:  yes  Hot flashes: no    CLINICAL PHOTOGRAPHS - SCAN - DIAGNOSTIC LAPAROSCOPY PHOTO, JESIKA, 12/06/2023 (12/06/2023)    Op Note by Cristal Grace DO (12/06/2023 11:37)    CBC & Differential (12/06/2023 07:43)  Preoperative labs included low white blood cell count, plan to follow-up today    PHYSICAL EXAM:  /66   Pulse 90   Ht 157.5 cm (62.01\")   Wt 74.1 kg (163 lb 6.4 oz)   LMP 12/09/2023 (Exact Date)   BMI 29.88 kg/m²   General- NAD, alert and oriented, appropriate  Psych- Normal mood, good memory    Abdomen- Soft, non distended, non tender, no masses  Incision/s-  Clean, dry, intact, healing well     ASSESSMENT and PLAN:  Post-operative exam    Diagnoses and all orders for this visit:    1. Postoperative follow-up (Primary)  Comments:  Operative laparoscopy with fulguration of endometriosis and lysis of adhesions    2. Endometriosis determined by laparoscopy  Overview:  July 2023 US wnl except multiple bilat ov follicles  Norethindrone, SE, decreased sex drive  Slynd, SE, decreased sex drive and acne  Refractory to medical management.  L/S Dec 2023  Op Note by Cristal Grace DO (12/06/2023 11:37)      3. Neutropenia, unspecified type  -     CBC (No Diff)        Operative report, surgical findings and any pathology/photos reviewed.  All questions answered.     Counseling:   Ok to resume normal activities  Ok to resume intercourse    ENDOMETRIOSIS- Discussed Dx, incidence, familial tendency, recurrence, periods/pain/dyspareunia, pregnancy. Tx options wrt pt hx NLT expectant, hormonal (BC, IUD, Lupron, Orilissa, others), outpt dx L/S, hyst +/- BSO.    Follow " Up:  Return if symptoms return.            Cristal Grace DO  01/08/2024    [facility] [place] [facility]  [address] [place] [address]  Dept: [phone]  Dept Fax: [phone]  Loc: [phone]  Loc Fax: [phone]

## 2024-01-08 ENCOUNTER — OFFICE VISIT (OUTPATIENT)
Dept: OBSTETRICS AND GYNECOLOGY | Facility: CLINIC | Age: 29
End: 2024-01-08
Payer: COMMERCIAL

## 2024-01-08 VITALS
WEIGHT: 163.4 LBS | SYSTOLIC BLOOD PRESSURE: 130 MMHG | HEART RATE: 90 BPM | DIASTOLIC BLOOD PRESSURE: 66 MMHG | BODY MASS INDEX: 30.07 KG/M2 | HEIGHT: 62 IN

## 2024-01-08 DIAGNOSIS — Z09 POSTOPERATIVE FOLLOW-UP: Primary | ICD-10-CM

## 2024-01-08 DIAGNOSIS — N80.9 ENDOMETRIOSIS DETERMINED BY LAPAROSCOPY: ICD-10-CM

## 2024-01-08 DIAGNOSIS — D70.9 NEUTROPENIA, UNSPECIFIED TYPE: ICD-10-CM

## 2024-01-08 LAB
DEPRECATED RDW RBC AUTO: 40.1 FL (ref 37–54)
ERYTHROCYTE [DISTWIDTH] IN BLOOD BY AUTOMATED COUNT: 12.4 % (ref 12.3–15.4)
HCT VFR BLD AUTO: 39.2 % (ref 34–46.6)
HGB BLD-MCNC: 13 G/DL (ref 12–15.9)
MCH RBC QN AUTO: 29.3 PG (ref 26.6–33)
MCHC RBC AUTO-ENTMCNC: 33.2 G/DL (ref 31.5–35.7)
MCV RBC AUTO: 88.3 FL (ref 79–97)
PLATELET # BLD AUTO: 261 10*3/MM3 (ref 140–450)
PMV BLD AUTO: 10.6 FL (ref 6–12)
RBC # BLD AUTO: 4.44 10*6/MM3 (ref 3.77–5.28)
WBC NRBC COR # BLD AUTO: 4.42 10*3/MM3 (ref 3.4–10.8)

## 2024-01-08 PROCEDURE — 36415 COLL VENOUS BLD VENIPUNCTURE: CPT | Performed by: OBSTETRICS & GYNECOLOGY

## 2024-01-08 PROCEDURE — 85027 COMPLETE CBC AUTOMATED: CPT | Performed by: OBSTETRICS & GYNECOLOGY

## 2024-01-08 PROCEDURE — 99024 POSTOP FOLLOW-UP VISIT: CPT | Performed by: OBSTETRICS & GYNECOLOGY

## 2024-01-08 NOTE — PATIENT INSTRUCTIONS
Venipuncture Blood Specimen Collection  Venipuncture performed in left arm by Silvana Rodriguez with good hemostasis. Patient tolerated the procedure well without complications.   01/08/24   Silvana Rodriguez

## 2024-01-10 ENCOUNTER — TELEPHONE (OUTPATIENT)
Dept: OBSTETRICS AND GYNECOLOGY | Facility: CLINIC | Age: 29
End: 2024-01-10
Payer: COMMERCIAL

## 2024-01-10 NOTE — TELEPHONE ENCOUNTER
----- Message from Cristal Grace DO sent at 1/9/2024  9:16 AM EST -----  White blood cell counts are normal, no further follow-up needed.

## 2024-02-21 NOTE — PROGRESS NOTES
"Well Woman Visit    CC: Scheduled annual well gyn visit  Chief Complaint   Patient presents with    Gynecologic Exam       HPI:   28 y.o.   Social History     Substance and Sexual Activity   Sexual Activity Yes    Partners: Male    Birth control/protection: Vasectomy     HM PAP SMEAR (2021) negative by report    Recent laparoscopy with fulguration of endometriosis, history of PE  Menses:   q mo, lasts 4-5 days, changes products q 2hrs on heaviest days.   Pain with menses:  still on left side, starts 2d before menses, lasts minutes and feels crampy.  Aleeve helps.  Pain is better than preop.     Pt has no complaints today.    PCP: does manage PMHx and preventative labs  History: PMHx, Meds, Allergies, PSHx, Social Hx, and POBHx all reviewed and updated.    PHYSICAL EXAM:  /87   Pulse 85   Ht 157.5 cm (62.01\")   Wt 73 kg (161 lb)   LMP 2024 (Exact Date)   BMI 29.44 kg/m²  Not found.   General- NAD, alert and oriented, appropriate  Psych- Normal mood, good memory  Neck- No masses, no thyroid enlargement  CV- Regular rhythm, no murmurs  Resp- CTA to bases, no wheezes  Abdomen- Soft, non distended, non tender, no masses    Breast left-  Bilaterally symmetrical, no masses, non tender, no nipple discharge, no axillary or supraclavicular nodes palpable.    Breast right- Bilaterally symmetrical, no masses, non tender, no nipple discharge, no axillary or supraclavicular nodes palpable.       Chaperone present during pelvic exam.  External genitalia- Normal female, no lesions  Urethra/meatus- Normal, no masses, non tender  Bladder- Normal, no masses, non tender  Vagina- Normal, no atrophy, no lesions, no discharge.    Prolapse : none noted, not examined with split speculum to delineate  Cvx- Normal, no lesions, no discharge, No cervical motion tenderness  Uterus- Normal size, shape & consistency.  Non tender, mobile.  Adnexa- No mass, non tender  Anus/Rectum/Perineum- Not performed    Lymphatic- " No palpable neck, axillary, or groin nodes  Ext- No edema, no cyanosis    Skin- No lesions, no rashes, no acanthosis nigricans      ASSESSMENT and PLAN:    Diagnoses and all orders for this visit:    1. Well woman exam (Primary)  -     IGP,rfx Aptima HPV All Pth    2. Endometriosis determined by laparoscopy  Comments:  Hx of  Overview:  July 2023 US wnl except multiple bilat ov follicles  Norethindrone, SE, decreased sex drive  Slynd, SE, decreased sex drive and acne  Refractory to medical management.  L/S Dec 2023  Op Note by Cristal Grace DO (12/06/2023 11:37)    2/26/2024 declines Tx for now      3. Hx of pulmonary embolus  Overview:  With nuvaring 2018, thrombophilia panel neg  S/p 6mo anticoagulation  Stable on no anticoagulation      4. Family history of cancer  Overview:  MGM 53yo breast cancer, passed w colon cancer  Pts mother unable to be tested  2/26/2024 info sent for possible VistaSeq          Preventative:  BREAST HEALTH- Monthly self breast exam importance and how to reviewed. MMG and/or MRI (prn) reviewed per society guidelines and her individual history. Screen: Not medically needed  CERVICAL CANCER Screening- Reviewed current ASCCP guidelines for screening w and wo cotest HPV, age specific.  Screen: Updated today  COLON CANCER Screening- Reviewed current medical society guidelines and options.  Screen:  Not medically needed  Importance of WEIGHT MANAGEMENT, nutrition, and exercise reviewed  BONE HEALTH- Reviewed current medical society guidelines and options for testing, calcium and vit D intake.  Weight bearing exercise.  DEXA: Not medically needed  VACCINATIONS Recommended: Covid vaccine, Flu annually.  Importance discussed, risk being unvaccinated reviewed.  Questions answered  Smoking status- NON SMOKER  Follow up PCP/Specialist PMHx and/or Labs    HEREDITARY CANCER SCREEN : Counseled and evaluated for hereditary cancer testing. Testing accepted. Patients information and Fhx will be sent to  genetic counselor to review and discuss with patient options for testing if she qualifies.   She is to contact our office if she has not heard from the genetic counselor in the next 2 weeks.       She understands the importance of having any ordered tests to be performed in a timely fashion.  The risks of not performing them include, but are not limited to, advanced cancer stages, bone loss from osteoporosis and/or subsequent increase in morbidity and/or mortality.  She is encouraged to review her results online and/or contact or office if she has questions.     Follow Up:  Return in about 1 year (around 2/26/2025) for WWE sooner prn desire for tx for endometriosis.            Cristal Grace, DO  02/26/2024    [facility] [place] [place] [facility]  [facility] [place] [address]  Dept: [phone]  Dept Fax: [phone]  Loc: [phone]  Loc Fax: [phone]

## 2024-02-25 PROBLEM — Z80.3 FAMILY HISTORY OF BREAST CANCER: Status: ACTIVE | Noted: 2024-02-25

## 2024-02-26 ENCOUNTER — OFFICE VISIT (OUTPATIENT)
Dept: OBSTETRICS AND GYNECOLOGY | Facility: CLINIC | Age: 29
End: 2024-02-26
Payer: COMMERCIAL

## 2024-02-26 VITALS
SYSTOLIC BLOOD PRESSURE: 122 MMHG | WEIGHT: 161 LBS | HEIGHT: 62 IN | DIASTOLIC BLOOD PRESSURE: 87 MMHG | HEART RATE: 85 BPM | BODY MASS INDEX: 29.63 KG/M2

## 2024-02-26 DIAGNOSIS — N80.9 ENDOMETRIOSIS DETERMINED BY LAPAROSCOPY: ICD-10-CM

## 2024-02-26 DIAGNOSIS — Z86.711 HX OF PULMONARY EMBOLUS: ICD-10-CM

## 2024-02-26 DIAGNOSIS — Z01.419 WELL WOMAN EXAM: Primary | ICD-10-CM

## 2024-02-26 DIAGNOSIS — Z80.9 FAMILY HISTORY OF CANCER: ICD-10-CM

## 2024-02-26 PROCEDURE — 99395 PREV VISIT EST AGE 18-39: CPT | Performed by: OBSTETRICS & GYNECOLOGY

## 2024-02-26 PROCEDURE — 99459 PELVIC EXAMINATION: CPT | Performed by: OBSTETRICS & GYNECOLOGY

## 2024-02-26 PROCEDURE — G0123 SCREEN CERV/VAG THIN LAYER: HCPCS | Performed by: OBSTETRICS & GYNECOLOGY

## 2024-02-26 RX ORDER — CHLORAL HYDRATE 500 MG
1000 CAPSULE ORAL
COMMUNITY

## 2024-02-29 LAB
CONV .: NORMAL
CYTOLOGIST CVX/VAG CYTO: NORMAL
CYTOLOGY CVX/VAG DOC CYTO: NORMAL
CYTOLOGY CVX/VAG DOC THIN PREP: NORMAL
DX ICD CODE: NORMAL
Lab: NORMAL
OTHER STN SPEC: NORMAL
STAT OF ADQ CVX/VAG CYTO-IMP: NORMAL

## 2024-05-08 ENCOUNTER — PATIENT ROUNDING (BHMG ONLY) (OUTPATIENT)
Dept: NEUROLOGY | Facility: CLINIC | Age: 29
End: 2024-05-08
Payer: COMMERCIAL

## 2024-05-08 ENCOUNTER — OFFICE VISIT (OUTPATIENT)
Dept: NEUROLOGY | Facility: CLINIC | Age: 29
End: 2024-05-08
Payer: COMMERCIAL

## 2024-05-08 VITALS
WEIGHT: 157.3 LBS | BODY MASS INDEX: 28.95 KG/M2 | HEIGHT: 62 IN | HEART RATE: 80 BPM | SYSTOLIC BLOOD PRESSURE: 130 MMHG | DIASTOLIC BLOOD PRESSURE: 87 MMHG

## 2024-05-08 DIAGNOSIS — G43.019 INTRACTABLE MIGRAINE WITHOUT AURA AND WITHOUT STATUS MIGRAINOSUS: Primary | ICD-10-CM

## 2024-05-08 RX ORDER — PROPRANOLOL HYDROCHLORIDE 20 MG/1
20 TABLET ORAL 2 TIMES DAILY
Qty: 60 TABLET | Refills: 3 | Status: SHIPPED | OUTPATIENT
Start: 2024-05-08

## 2024-05-08 RX ORDER — RIZATRIPTAN BENZOATE 10 MG/1
10 TABLET, ORALLY DISINTEGRATING ORAL ONCE AS NEEDED
Qty: 12 TABLET | Refills: 5 | Status: SHIPPED | OUTPATIENT
Start: 2024-05-08 | End: 2024-06-07

## 2024-05-10 PROBLEM — G43.019 INTRACTABLE MIGRAINE WITHOUT AURA AND WITHOUT STATUS MIGRAINOSUS: Status: ACTIVE | Noted: 2024-05-10

## 2024-06-27 ENCOUNTER — HOSPITAL ENCOUNTER (EMERGENCY)
Facility: HOSPITAL | Age: 29
Discharge: HOME | End: 2024-06-27
Attending: EMERGENCY MEDICINE
Payer: COMMERCIAL

## 2024-06-27 VITALS
WEIGHT: 159.61 LBS | BODY MASS INDEX: 29.37 KG/M2 | RESPIRATION RATE: 17 BRPM | SYSTOLIC BLOOD PRESSURE: 142 MMHG | OXYGEN SATURATION: 100 % | TEMPERATURE: 98.5 F | HEART RATE: 81 BPM | HEIGHT: 62 IN | DIASTOLIC BLOOD PRESSURE: 108 MMHG

## 2024-06-27 DIAGNOSIS — Z13.9 ENCOUNTER FOR MEDICAL SCREENING EXAMINATION: ICD-10-CM

## 2024-06-27 DIAGNOSIS — R89.9 ABNORMAL LABORATORY TEST: Primary | ICD-10-CM

## 2024-06-27 LAB
ANION GAP SERPL CALCULATED.3IONS-SCNC: 11.3 MMOL/L (ref 5–15)
BUN SERPL-MCNC: 16 MG/DL (ref 6–20)
BUN/CREAT SERPL: 17 (ref 7–25)
CALCIUM SPEC-SCNC: 9.1 MG/DL (ref 8.6–10.5)
CHLORIDE SERPL-SCNC: 105 MMOL/L (ref 98–107)
CO2 SERPL-SCNC: 23.7 MMOL/L (ref 22–29)
CREAT SERPL-MCNC: 0.94 MG/DL (ref 0.57–1)
EGFRCR SERPLBLD CKD-EPI 2021: 84.9 ML/MIN/1.73
GLUCOSE SERPL-MCNC: 90 MG/DL (ref 65–99)
POTASSIUM SERPL-SCNC: 3.8 MMOL/L (ref 3.5–5.2)
SODIUM SERPL-SCNC: 140 MMOL/L (ref 136–145)

## 2024-06-27 PROCEDURE — 36415 COLL VENOUS BLD VENIPUNCTURE: CPT

## 2024-06-27 PROCEDURE — 80048 BASIC METABOLIC PNL TOTAL CA: CPT | Performed by: EMERGENCY MEDICINE

## 2024-06-27 PROCEDURE — 99283 EMERGENCY DEPT VISIT LOW MDM: CPT

## 2024-06-28 NOTE — ED PROVIDER NOTES
Time: 10:53 PM EDT  Date of encounter:  6/27/2024  Independent Historian/Clinical History and Information was obtained by:   Patient    History is limited by: N/A    Chief Complaint: Abnormally elevated potassium on blood work      History of Present Illness:  Patient is a 29 y.o. year old female who presents to the emergency department for evaluation of abnormal lab work including elevated potassium of 6.4 on earlier blood work from doctor's office.    Patient is asymptomatic.    She does not have any previous history of kidney disease or elevated potassium.    She has been started on some new medications including Topamax, lamotrigine, propranolol as she does have headaches and anxiety and depression.        HPI    Patient Care Team  Primary Care Provider: Teetee Marshall APRN    Past Medical History:     No Known Allergies  Past Medical History:   Diagnosis Date    Anxiety     COVID-19 affecting childbirth 08/26/2021    Depression     Endometriosis determined by laparoscopy 12/06/2023    Gestational hypertension     Hypertension     CHRONIC NO MEDS- SEEN DR COYLE WHEN PREGNANT BUT NOT SEEN SINCE THEN AND ON NO MEDS    Major depressive disorder 09/08/2021    No meds, cont to monitor    Migraine withOUT aura     PCOS (polycystic ovarian syndrome) 06/14/2023    Pulmonary embolism 2018    WHILE USING NUVARING.  THROMBOPHILIA PANEL NEG AND NO FHX ARBEN.     Past Surgical History:   Procedure Laterality Date    DIAGNOSTIC LAPAROSCOPY N/A 12/6/2023    Procedure: DIAGNOSTIC LAPAROSCOPY, fulgeration of endometriosis;  Surgeon: Cristal Grace DO;  [place] [facility] [place]  Service: Gynecology;  Laterality: N/A;    GUM SURGERY      Gum graft    KIDNEY STONE SURGERY  01/12/2021    w ureteral stent placement and removal    MULTIPLE TOOTH EXTRACTIONS      WISDOM TOOTH EXTRACTION       Family History   Problem Relation Age of Onset    Hypertension Mother     Polycystic ovary syndrome Paternal Grandmother     Colon cancer  Maternal Grandmother     Breast cancer Maternal Grandmother     Lung cancer Maternal Grandmother     Hyperlipidemia Maternal Grandfather     Hypertension Maternal Grandfather     Deep vein thrombosis Maternal Aunt         Mat. Great Aunt    Breast cancer Maternal Aunt     Ovarian cancer Neg Hx     Uterine cancer Neg Hx     Pulmonary embolism Neg Hx        Home Medications:  Prior to Admission medications    Medication Sig Start Date End Date Taking? Authorizing Provider   docusate sodium (COLACE) 50 MG capsule Take 1 capsule by mouth Daily.    Edward Palmer MD   magnesium oxide (MAG-OX) 400 MG tablet Take 1 tablet by mouth Daily.    Edward Palmer MD   NON FORMULARY Take 1 tablet by mouth Daily. Evening primrose  Patient not taking: Reported on 5/8/2024    Edward Palmer MD   Omega-3 Fatty Acids (fish oil) 1000 MG capsule capsule Take 1 capsule by mouth Daily With Breakfast.    Edward Palmer MD   Pristiq 100 MG 24 hr tablet Take 1 tablet by mouth Daily. 8/1/23   Edward Palmer MD   propranolol (INDERAL) 20 MG tablet Take 1 tablet by mouth 2 (Two) Times a Day. 1 tablet BID 5/8/24   Mel Garcia APRN   rizatriptan MLT (MAXALT-MLT) 10 MG disintegrating tablet Place 1 tablet on the tongue 1 (One) Time As Needed for Migraine for up to 30 days. May repeat in 2 hours if needed 5/8/24 6/7/24  Mel Garcia APRN   topiramate (TOPAMAX) 25 MG tablet Take 1 tablet by mouth 2 (Two) Times a Day. 11/22/23   Edward Palmer MD   vitamin E 200 UNIT capsule Take 180 Units by mouth Daily.    Edward Palmer MD        Social History:   Social History     Tobacco Use    Smoking status: Never     Passive exposure: Never    Smokeless tobacco: Never   Vaping Use    Vaping status: Never Used   Substance Use Topics    Alcohol use: Not Currently    Drug use: Never         Review of Systems:  Review of Systems   I performed a 10 point review of systems which was all negative, except  "for the positives found in the HPI above.    Physical Exam:  BP (!) 142/108 (BP Location: Left arm, Patient Position: Sitting)   Pulse 81   Temp 98.5 °F (36.9 °C) (Oral)   Resp 17   Ht 157.5 cm (62\")   Wt 72.4 kg (159 lb 9.8 oz)   LMP 04/14/2024 (Exact Date)   SpO2 100%   BMI 29.19 kg/m²     Physical Exam   General: Awake alert and in no obvious distress    HEENT: Head normocephalic atraumatic, eyes PERRLA EOMI, nose normal, oropharynx normal.    Neck: Supple full range of motion, no meningismus, no lymphadenopathy    Heart: Regular rate and rhythm, no murmurs or rubs, 2+ radial pulses bilaterally    Lungs: Clear to auscultation bilaterally without wheezes or crackles, no respiratory distress    Abdomen: Soft, nontender, nondistended, no rebound or guarding    Skin: Warm, dry, no rash    Musculoskeletal: Normal range of motion, no lower extremity edema    Neurologic: Oriented x3, no motor deficits no sensory deficits    Psychiatric: Mood appears stable, no psychosis          Procedures:  Procedures      Medical Decision Making:      Comorbidities that affect care:    Anxiety, depression, migraine headaches    External Notes reviewed:    Previous Labs: I reviewed all of her lab work done at the outside lab from earlier today and it looks like she had an elevated potassium level of 6.4, but I note normal renal function and the remainder of her lab work was unremarkable.      The following orders were placed and all results were independently analyzed by me:  Orders Placed This Encounter   Procedures    Basic Metabolic Panel       Medications Given in the Emergency Department:  Medications - No data to display     ED Course:         Labs:    Lab Results (last 24 hours)       Procedure Component Value Units Date/Time    HEMOGLOBIN A1C [[id number]] Collected: 06/27/24 1138     Updated: 06/27/24 2241     Hemoglobin A1C 4.5 %      Comment: (note)  A1C% Reference Range:  4.3 - 5.6  Normal range  5.7 - 6.4  " Pre-diabetic -increased risk for developing diabetes  mellitus.  >=6.5      Diabetic -diagnostic of diabetes mellitus.    Note: For diagnosis of diabetes in individuals without unequivocal  hyperglycemia, results should be confirmed by repeat testing.  Patients with conditions that shorten erythrocyte survival, such as  recovery from acute blood loss, hemolytic anemia, kidney disease,  or the presence of unstable hemogloblins like HbSS, HbCC, and HbSC  may yield falsely decreased HbA1c test results. Iron deficiency may  yield falsely increased HbA1c test results.        Mean Bld Glu Estim. 82 mg/dL     CBC AND DIFFERENTIAL [[id number]] Collected: 06/27/24 1138     Updated: 06/27/24 2241     WBC 4.51 10*3/uL      RBC 4.72 10*6/uL      Hemoglobin 13.3 g/dL      Hematocrit 42.0 %      MCV 89.0 fL      MCH 28.2 pg      MCHC 31.7 g/dL      RDW 13.4 %      Platelets 255 10*3/uL      MPV 11.5 fL      Differential Type Hospital CBC w/AutoDiff (arb'U)      Neutrophil Rel % 54.8 %      Lymphocyte Rel % 36.6 %      Monocyte Rel % 8.0 %      Eosinophil % 0.0 %      Basophil Rel % 0.4 %      Immature Grans % 0.2 %      nRBC 0 /100(WBC)      Neutrophils Absolute 2.47 10*3/uL      Lymphocytes Absolute 1.65 10*3/uL      Monocytes Absolute 0.36 10*3/uL      Eosinophils Absolute 0.00 10*3/uL      Basophils Absolute 0.02 10*3/uL      Immature Grans, Absolute 0.01 10*3/uL     VITAMIN B12 [[id number]] Collected: 06/27/24 1138     Updated: 06/27/24 2241     Vitamin B-12 307 pg/mL     TSH [[id number]] Collected: 06/27/24 1138     Updated: 06/27/24 2241    MAGNESIUM [[id number]] Collected: 06/27/24 1138    Specimen: Fresh Frozen Plasma Updated: 06/27/24 2241     Magnesium 2.2 mg/dL     LIPID PANEL [[id number]]  (Abnormal) Collected: 06/27/24 1138    Specimen: Fresh Frozen Plasma Updated: 06/27/24 2241     Triglycerides 126 mg/dL      Comment: (note)  Triglyceride levels (in mg/dL):  Normal (0-9yrs: <75, 10-19yrs: <90, >19yrs: <150),  Borderline  (0-9yrs: 75-99, 10-19yrs: , >19yrs: 150-199),  High/very high (0-9yrs: >99, 10-19yrs: >129, >19yrs: >200)        Total Cholesterol 218 mg/dL      Comment: (note)  Cholesterol levels (in mg/dL):  Desirable <200 adults/<170 children,  Borderline-high: 200-239 adults/170-199 children,  High >239 adults/>199 children.        HDL Cholesterol 59 mg/dL      LDL Cholesterol  134 mg/dL      Comment: (note)  LDL levels (in mg/dL):  Optimal <100,  Near optimal/above optimal 100-129,  Borderline High 130-159,  High 160-189, or  Very High >189        VLDL Cholesterol 25 mg/dL      Chol/HDL Ratio 3.7 RATIO     T4, FREE [[id number]] Collected: 06/27/24 1138     Updated: 06/27/24 2241     Free T4 0.94 ng/dL     FOLATE [[id number]] Collected: 06/27/24 1138    Specimen: Fresh Frozen Plasma Updated: 06/27/24 2241     Folate 11.0 ng/mL     FERRITIN [[id number]] Collected: 06/27/24 1138    Specimen: Fresh Frozen Plasma Updated: 06/27/24 2241     Ferritin 16.5 ng/mL     COMPREHENSIVE METABOLIC PANEL [[id number]]  (Abnormal) Collected: 06/27/24 1138     Updated: 06/27/24 2241    Pregnancy, Urine - [[id number]] Collected: 06/27/24 1138    Specimen: Urine Updated: 06/27/24 2241     HCG Qualitative Negative    Basic Metabolic Panel [[id number]]  (Normal) Collected: 06/27/24 2252    Specimen: Blood Updated: 06/27/24 2318     Glucose 90 mg/dL      BUN 16 mg/dL      Creatinine 0.94 mg/dL      Sodium 140 mmol/L      Potassium 3.8 mmol/L      Chloride 105 mmol/L      CO2 23.7 mmol/L      Calcium 9.1 mg/dL      BUN/Creatinine Ratio 17.0     Anion Gap 11.3 mmol/L      eGFR 84.9 mL/min/1.73     Narrative:      GFR Normal >60  Chronic Kidney Disease <60  Kidney Failure <15               Imaging:    No Radiology Exams Resulted Within Past 24 Hours      Differential Diagnosis and Discussion:    Hyperkalemia and differential including pseudohyperkalemia versus renal failure.    All labs were reviewed and interpreted by me.    MDM     Amount and/or  Complexity of Data Reviewed  Clinical lab tests: reviewed             This patient is a 29-year-old female who looks healthy appearing but had abnormally elevated potassium at screening doctor labs today as an outpatient.    Given her normal renal function, I consider it is most likely a hemolyzed sample and we are rechecking it here in the emergency department now.    She looks hemodynamically stable and, if potassium comes back negative here I think she can be given reassurance and follow-up with her doctor as needed.      *Fortunately, patient's lab work look completely normal today including normal potassium of 3.8, normal electrolytes and renal function.    I explained to her that it was probably hemolyzed early when drawn at the doctor's office and she was given reassurance and discharged home to follow-up with her doctor as needed.              Patient Care Considerations:          Consultants/Shared Management Plan:        Social Determinants of Health:    Patient is independent, reliable, and has access to care.       Disposition and Care Coordination:    Discharged: The patient is suitable and stable for discharge with no need for consideration of admission.    I have explained the patient´s condition, diagnoses and treatment plan based on the information available to me at this time. I have answered questions and addressed any concerns. The patient has a good  understanding of the patient´s diagnosis, condition, and treatment plan as can be expected at this point. The vital signs have been stable. The patient´s condition is stable and appropriate for discharge from the emergency department.      The patient will pursue further outpatient evaluation with the primary care physician or other designated or consulting physician as outlined in the discharge instructions. They are agreeable to this plan of care and follow-up instructions have been explained in detail. The patient has received these instructions  in written format and has expressed an understanding of the discharge instructions. The patient is aware that any significant change in condition or worsening of symptoms should prompt an immediate return to this or the closest emergency department or call to 1.  I have explained discharge medications and the need for follow up with the patient/caretakers. This was also printed in the discharge instructions. Patient was discharged with the following medications and follow up:      Medication List      No changes were made to your prescriptions during this visit.      Teetee Marshall, APRN  [address] [address]  [phone]    Call in 2 days  As needed       Final diagnoses:   Abnormal laboratory test   Encounter for medical screening examination        ED Disposition       ED Disposition   Discharge    Condition   Stable    Comment   --               This medical record created using voice recognition software.             Regino Jones MD  06/27/24 2215

## 2024-06-28 NOTE — DISCHARGE INSTRUCTIONS
Your potassium level was 3.8, which is completely normal.    It sounds like given earlier blood draw was a fluke, due to hemolysis of the blood work, which gives a falsely elevated potassium.

## 2024-07-03 ENCOUNTER — OFFICE VISIT (OUTPATIENT)
Dept: NEUROLOGY | Facility: CLINIC | Age: 29
End: 2024-07-03
Payer: COMMERCIAL

## 2024-07-03 VITALS
HEIGHT: 62 IN | WEIGHT: 157 LBS | DIASTOLIC BLOOD PRESSURE: 84 MMHG | BODY MASS INDEX: 28.89 KG/M2 | SYSTOLIC BLOOD PRESSURE: 123 MMHG | HEART RATE: 85 BPM

## 2024-07-03 DIAGNOSIS — G43.019 INTRACTABLE MIGRAINE WITHOUT AURA AND WITHOUT STATUS MIGRAINOSUS: Primary | ICD-10-CM

## 2024-07-03 DIAGNOSIS — E53.8 B12 DEFICIENCY: ICD-10-CM

## 2024-07-03 PROBLEM — R51.9 NONINTRACTABLE HEADACHE: Status: RESOLVED | Noted: 2023-11-06 | Resolved: 2024-07-03

## 2024-07-03 RX ORDER — TOPIRAMATE 25 MG/1
25 TABLET ORAL 2 TIMES DAILY
Qty: 60 TABLET | Refills: 3 | Status: SHIPPED | OUTPATIENT
Start: 2024-07-03

## 2024-07-03 RX ORDER — MAGNESIUM 200 MG
1000 TABLET ORAL DAILY
Qty: 30 EACH | Refills: 3 | Status: SHIPPED | OUTPATIENT
Start: 2024-07-03

## 2024-07-03 RX ORDER — PROPRANOLOL HYDROCHLORIDE 20 MG/1
20 TABLET ORAL 2 TIMES DAILY
Qty: 60 TABLET | Refills: 3 | Status: SHIPPED | OUTPATIENT
Start: 2024-07-03

## 2024-07-03 RX ORDER — FREMANEZUMAB-VFRM 225 MG/1.5ML
225 INJECTION SUBCUTANEOUS
Qty: 1.5 ML | Refills: 5 | Status: SHIPPED | OUTPATIENT
Start: 2024-07-03

## 2024-07-03 RX ORDER — LAMOTRIGINE 25 MG/1
50 TABLET ORAL NIGHTLY
COMMUNITY
Start: 2024-06-13

## 2024-07-03 NOTE — ASSESSMENT & PLAN NOTE
Continue topiramate and add propranolol for preventative therapy of migraine.  Start Ajovy for additional migraine prevention.  First dose of Ajovy given in the office with a sample to initiate treatment. Maxalt PRN for abortive therapy.

## 2024-07-03 NOTE — PROGRESS NOTES
"Chief Complaint  Migraine    Subjective          Ana Brito presents to [facility] NEUROLOGY & NEUROSURGERY  History of Present Illness  Following up for migraine.  No improvement with propranolol.  Having about 10 headache days per month currently.  Maxalt is effective abortive therapy.       Interval History:   She reports that she developed headaches many years ago. Since that time, her headaches have progressively worsened.   Currently, she reports headaches that are located right temporal, frontal, and retro-orbital. She characterizes the headaches as 8/10 in severity, piercing and stabbing in nature with associated photophobia, phonophobia, and nausea. She reports headaches last 6+ hours. She reports 8 headache days per month. She denies associated aura. She denies focal numbness, weakness, speech, and vision changes.   Triggers: weather and menstrual cycle   Symptoms improved by: Dark quiet room and Sleep   She states she is sleeping well. Reports getting 8+ hours of sleep per night. denies snoring. Reports refreshing sleep.   Prior prophylactic medications include: topiramate, pristiq, TCAs contraindicated d/t medication interactions, propranolol  She  uses abortive therapy such as: imitrex,   Caffeine Use: 1 servings daily  Childbearing potential :  had vasectomy   History of Kidney Stones: Yes  She denies a family history of cerebral aneurysm.            Objective   Vital Signs:   /84   Pulse 85   Ht 157.5 cm (62\")   Wt 71.2 kg (157 lb)   BMI 28.72 kg/m²     Physical Exam  HENT:      Head: Normocephalic.   Pulmonary:      Effort: Pulmonary effort is normal.   Neurological:      Mental Status: She is alert and oriented to person, place, and time.      Sensory: Sensation is intact.      Motor: Motor function is intact.      Coordination: Coordination is intact.      Deep Tendon Reflexes: Reflexes are normal and symmetric.        Neurologic Exam     Mental Status "   Oriented to person, place, and time.        Result Review :               Assessment and Plan    Diagnoses and all orders for this visit:    1. Intractable migraine without aura and without status migrainosus (Primary)  Assessment & Plan:  Continue topiramate and add propranolol for preventative therapy of migraine.  Start Ajovy for additional migraine prevention.  First dose of Ajovy given in the office with a sample to initiate treatment. Maxalt PRN for abortive therapy.               2. B12 deficiency  Assessment & Plan:  Start SL B12 supplementation      Other orders  -     Cyanocobalamin (Vitamin B-12) 1000 MCG sublingual tablet; Place 1 tablet under the tongue Daily.  Dispense: 30 each; Refill: 3  -     Fremanezumab-vfrm (Ajovy) 225 MG/1.5ML solution auto-injector; Inject 225 mg under the skin into the appropriate area as directed Every 30 (Thirty) Days.  Dispense: 1.5 mL; Refill: 5  -     propranolol (INDERAL) 20 MG tablet; Take 1 tablet by mouth 2 (Two) Times a Day. 1 tablet BID  Dispense: 60 tablet; Refill: 3  -     topiramate (TOPAMAX) 25 MG tablet; Take 1 tablet by mouth 2 (Two) Times a Day.  Dispense: 60 tablet; Refill: 3        Follow Up   No follow-ups on file.  Patient was given instructions and counseling regarding her condition or for health maintenance advice. Please see specific information pulled into the AVS if appropriate.

## 2024-08-01 ENCOUNTER — TELEPHONE (OUTPATIENT)
Dept: NEUROLOGY | Facility: CLINIC | Age: 29
End: 2024-08-01
Payer: COMMERCIAL

## 2024-08-01 NOTE — TELEPHONE ENCOUNTER
Provider: KARL ROSAS     Caller: PATIENT    Relationship to Patient: SELF    Pharmacy: [facility]    Phone Number: [phone]    Reason for Call: PT IS CALLING BACK ABOUT PA FOR CIRO.  [facility] HAS TOLD HER THEY HAVE NOT HEARD BACK FROM THE OFFICE.  PATIENT IS DUE FOR HER INJECTION TODAY.      PLEASE CALL PT TO ADVISE     THANK YOU

## 2024-08-01 NOTE — TELEPHONE ENCOUNTER
Spoke with pt on the phone and let her know Kuldip has been approved and have pharmacy rerun it and should be good to go. Pt verbalized understanding.

## 2024-08-01 NOTE — TELEPHONE ENCOUNTER
Provider: KARL ROSAS    Caller: PATIENT    Relationship to Patient: SELF    Pharmacy: [id number]    Phone Number: [phone]    Reason for Call: PATIENT IS DUE FOR LeapfactorOVY TODAY. PHARMACY ADVISED IT NEEDS A PRIOR AUTH. STATES THEY SENT A FAX OVER TODAY. PLEASE REVIEW & ADVISE, THANK YOU.

## 2024-08-01 NOTE — TELEPHONE ENCOUNTER
Ajovy PA submitted through [facility]  Approved today by [employer] 2017 [employer]  Request Reference Number: [id number]. AJOVY /1.5 is approved through 02/01/2025. Your patient may now fill this prescription and it will be covered.

## 2024-08-02 RX ORDER — FREMANEZUMAB-VFRM 225 MG/1.5ML
INJECTION SUBCUTANEOUS
Qty: 1.5 ML | Refills: 5 | OUTPATIENT
Start: 2024-08-02

## 2024-09-06 RX ORDER — PROPRANOLOL HYDROCHLORIDE 20 MG/1
20 TABLET ORAL 2 TIMES DAILY
Qty: 60 TABLET | Refills: 0 | Status: SHIPPED | OUTPATIENT
Start: 2024-09-06

## 2024-09-09 RX ORDER — PROPRANOLOL HYDROCHLORIDE 20 MG/1
20 TABLET ORAL 2 TIMES DAILY
Qty: 180 TABLET | OUTPATIENT
Start: 2024-09-09

## 2024-10-31 RX ORDER — TOPIRAMATE 25 MG/1
25 TABLET, FILM COATED ORAL 2 TIMES DAILY
Qty: 60 TABLET | Refills: 1 | Status: SHIPPED | OUTPATIENT
Start: 2024-10-31

## 2024-11-06 ENCOUNTER — OFFICE VISIT (OUTPATIENT)
Dept: NEUROLOGY | Facility: CLINIC | Age: 29
End: 2024-11-06
Payer: COMMERCIAL

## 2024-11-06 VITALS
BODY MASS INDEX: 29.44 KG/M2 | WEIGHT: 160 LBS | HEART RATE: 68 BPM | HEIGHT: 62 IN | SYSTOLIC BLOOD PRESSURE: 119 MMHG | DIASTOLIC BLOOD PRESSURE: 77 MMHG

## 2024-11-06 DIAGNOSIS — E53.8 B12 DEFICIENCY: ICD-10-CM

## 2024-11-06 DIAGNOSIS — G43.019 INTRACTABLE MIGRAINE WITHOUT AURA AND WITHOUT STATUS MIGRAINOSUS: Primary | ICD-10-CM

## 2024-11-06 RX ORDER — RIZATRIPTAN BENZOATE 10 MG/1
10 TABLET, ORALLY DISINTEGRATING ORAL ONCE AS NEEDED
Qty: 12 TABLET | Refills: 5 | Status: SHIPPED | OUTPATIENT
Start: 2024-11-06 | End: 2024-12-06

## 2024-11-06 RX ORDER — MAGNESIUM 200 MG
1000 TABLET ORAL DAILY
Qty: 30 EACH | Refills: 5 | Status: SHIPPED | OUTPATIENT
Start: 2024-11-06

## 2024-11-06 RX ORDER — LAMOTRIGINE 100 MG/1
100 TABLET ORAL
COMMUNITY
Start: 2024-10-14

## 2024-11-06 RX ORDER — FREMANEZUMAB-VFRM 225 MG/1.5ML
225 INJECTION SUBCUTANEOUS
Qty: 1.5 ML | Refills: 5 | Status: SHIPPED | OUTPATIENT
Start: 2024-11-06

## 2024-11-06 NOTE — PROGRESS NOTES
"Chief Complaint  Migraine    Subjective          Ana Flori Brito presents to [facility] NEUROLOGY & NEUROSURGERY  History of Present Illness    History of Present Illness  The patient is a 29-year-old female who presents to the office for a follow-up on her migraine management.    She remains on Ajovy for preventative therapy and Maxalt as needed for abortive therapy. She reports that her headaches have been well-managed, except for a period of about a week before her scheduled Ajovy injection. During this time, she experiences daily headaches. However, once she administers the Ajovy injection, her condition improves. She takes the Ajovy injection every 30 days and uses Maxalt as needed, which she finds effective.    She continues to take Topamax and propranolol. She describes the headaches as not severe, but rather persistent and bothersome. She also mentions that she recently refilled her Topamax prescription.       Objective   Vital Signs:   /77   Pulse 68   Ht 157.5 cm (62\")   Wt 72.6 kg (160 lb)   BMI 29.26 kg/m²     Physical Exam  HENT:      Head: Normocephalic.   Pulmonary:      Effort: Pulmonary effort is normal.   Neurological:      Mental Status: She is alert and oriented to person, place, and time.      Sensory: Sensation is intact.      Motor: Motor function is intact.      Coordination: Coordination is intact.      Deep Tendon Reflexes: Reflexes are normal and symmetric.        Neurological Exam  Mental Status  Alert. Oriented to person, place, and time.    Sensory  Normal sensation.    Reflexes  Deep tendon reflexes are 2+ and symmetric in all four extremities.    Coordination    Finger-to-nose, rapid alternating movements and heel-to-shin normal bilaterally without dysmetria.      Result Review :               Assessment and Plan    Diagnoses and all orders for this visit:    1. Intractable migraine without aura and without status migrainosus (Primary)    2. B12 " deficiency    Other orders  -     Fremanezumab-vfrm (Ajovy) 225 MG/1.5ML solution auto-injector; Inject 225 mg under the skin into the appropriate area as directed Every 30 (Thirty) Days.  Dispense: 1.5 mL; Refill: 5  -     rizatriptan MLT (MAXALT-MLT) 10 MG disintegrating tablet; Place 1 tablet on the tongue 1 (One) Time As Needed for Migraine for up to 30 days. May repeat in 2 hours if needed  Dispense: 12 tablet; Refill: 5  -     Cyanocobalamin (Vitamin B-12) 1000 MCG sublingual tablet; Place 1 tablet under the tongue Daily.  Dispense: 30 each; Refill: 5        Assessment & Plan  1. Migraine.  She is currently on Ajovy for preventative therapy and Maxalt as needed for abortive therapy. She is also taking Topamax and propranolol. She reports that her headaches return about a week before her next Ajovy shot, but they are not severe. The propranolol dosage will be reduced to once daily for a week, then discontinued due to potential side effects such as fatigue. After a month, Topamax will be taken only at night for a week before discontinuation. She is advised to monitor her headaches during this period. Refills for Ajovy and Maxalt have been sent to her pharmacy. She is instructed to inform the office if there is any worsening of her headaches.    2. Vitamin B12 deficiency.  She requested a refill for her B12, which has been sent to her pharmacy.    Follow-up  Patient is scheduled for a follow-up visit in 6 months.         Follow Up   Return in about 6 months (around 5/6/2025) for Migraine f/u.  Patient was given instructions and counseling regarding her condition or for health maintenance advice. Please see specific information pulled into the AVS if appropriate.       Patient or patient representative verbalized consent for the use of Ambient Listening during the visit with  KYAW Hinton for chart documentation. 11/8/2024  08:27 EST

## 2025-01-08 ENCOUNTER — APPOINTMENT (OUTPATIENT)
Dept: GENERAL RADIOLOGY | Facility: HOSPITAL | Age: 30
End: 2025-01-08
Payer: COMMERCIAL

## 2025-01-08 ENCOUNTER — HOSPITAL ENCOUNTER (EMERGENCY)
Facility: HOSPITAL | Age: 30
Discharge: HOME | End: 2025-01-08
Attending: EMERGENCY MEDICINE | Admitting: EMERGENCY MEDICINE
Payer: COMMERCIAL

## 2025-01-08 VITALS
RESPIRATION RATE: 17 BRPM | TEMPERATURE: 98 F | BODY MASS INDEX: 30.36 KG/M2 | HEART RATE: 86 BPM | SYSTOLIC BLOOD PRESSURE: 140 MMHG | HEIGHT: 62 IN | WEIGHT: 165 LBS | DIASTOLIC BLOOD PRESSURE: 91 MMHG | OXYGEN SATURATION: 100 %

## 2025-01-08 DIAGNOSIS — M54.50 ACUTE LEFT-SIDED LOW BACK PAIN, UNSPECIFIED WHETHER SCIATICA PRESENT: Primary | ICD-10-CM

## 2025-01-08 DIAGNOSIS — S20.222A CONTUSION OF LEFT SIDE OF BACK, INITIAL ENCOUNTER: ICD-10-CM

## 2025-01-08 PROCEDURE — 25010000002 ORPHENADRINE CITRATE PER 60 MG: Performed by: REGISTERED NURSE

## 2025-01-08 PROCEDURE — 97161 PT EVAL LOW COMPLEX 20 MIN: CPT | Performed by: PHYSICAL THERAPIST

## 2025-01-08 PROCEDURE — 96372 THER/PROPH/DIAG INJ SC/IM: CPT

## 2025-01-08 PROCEDURE — 25010000002 KETOROLAC TROMETHAMINE PER 15 MG: Performed by: REGISTERED NURSE

## 2025-01-08 PROCEDURE — 99283 EMERGENCY DEPT VISIT LOW MDM: CPT

## 2025-01-08 PROCEDURE — 97110 THERAPEUTIC EXERCISES: CPT | Performed by: PHYSICAL THERAPIST

## 2025-01-08 PROCEDURE — 72072 X-RAY EXAM THORAC SPINE 3VWS: CPT

## 2025-01-08 PROCEDURE — 72100 X-RAY EXAM L-S SPINE 2/3 VWS: CPT

## 2025-01-08 RX ORDER — KETOROLAC TROMETHAMINE 30 MG/ML
30 INJECTION, SOLUTION INTRAMUSCULAR; INTRAVENOUS ONCE
Status: COMPLETED | OUTPATIENT
Start: 2025-01-08 | End: 2025-01-08

## 2025-01-08 RX ORDER — LIDOCAINE 50 MG/G
1 PATCH TOPICAL EVERY 24 HOURS
Qty: 30 EACH | Refills: 0 | Status: SHIPPED | OUTPATIENT
Start: 2025-01-08

## 2025-01-08 RX ORDER — ORPHENADRINE CITRATE 30 MG/ML
60 INJECTION INTRAMUSCULAR; INTRAVENOUS ONCE
Status: COMPLETED | OUTPATIENT
Start: 2025-01-08 | End: 2025-01-08

## 2025-01-08 RX ORDER — METHOCARBAMOL 500 MG/1
500 TABLET, FILM COATED ORAL 4 TIMES DAILY PRN
Qty: 15 TABLET | Refills: 0 | Status: SHIPPED | OUTPATIENT
Start: 2025-01-08

## 2025-01-08 RX ORDER — LIDOCAINE 4 G/G
1 PATCH TOPICAL ONCE
Status: DISCONTINUED | OUTPATIENT
Start: 2025-01-08 | End: 2025-01-08 | Stop reason: HOSPADM

## 2025-01-08 RX ORDER — KETOROLAC TROMETHAMINE 10 MG/1
10 TABLET, FILM COATED ORAL EVERY 6 HOURS PRN
Qty: 20 TABLET | Refills: 0 | Status: SHIPPED | OUTPATIENT
Start: 2025-01-08 | End: 2025-01-13

## 2025-01-08 RX ADMIN — ORPHENADRINE CITRATE 60 MG: 60 INJECTION INTRAMUSCULAR; INTRAVENOUS at 09:22

## 2025-01-08 RX ADMIN — KETOROLAC TROMETHAMINE 30 MG: 30 INJECTION, SOLUTION INTRAMUSCULAR; INTRAVENOUS at 09:22

## 2025-01-08 RX ADMIN — LIDOCAINE 1 PATCH: 4 PATCH TOPICAL at 09:29

## 2025-01-08 NOTE — ED PROVIDER NOTES
Time: 9:10 AM EST  Date of encounter:  1/8/2025  Independent Historian/Clinical History and Information was obtained by:   Patient    History is limited by: N/A    Chief Complaint: Back pain      History of Present Illness:  Patient is a 29 y.o. year old female who presents to the emergency department accompanied by her  for evaluation of left lower back pain after she fell down icy steps this morning just prior to arrival.  Patient denies neck pain or upper back pain.  Patient denies loss of bowel or bladder control.  Patient is able to ambulate, however is in severe pain.      Patient Care Team  Primary Care Provider: Teetee Marshall APRN    Past Medical History:     No Known Allergies  Past Medical History:   Diagnosis Date    Anxiety     COVID-19 affecting childbirth 08/26/2021    Depression     Endometriosis determined by laparoscopy 12/06/2023    Gestational hypertension     Hypertension     CHRONIC NO MEDS- SEEN DR COYLE WHEN PREGNANT BUT NOT SEEN SINCE THEN AND ON NO MEDS    Major depressive disorder 09/08/2021    No meds, cont to monitor    Migraine withOUT aura     PCOS (polycystic ovarian syndrome) 06/14/2023    Pulmonary embolism 2018    WHILE USING NUVARING.  THROMBOPHILIA PANEL NEG AND NO FHX ARBEN.     Past Surgical History:   Procedure Laterality Date    DIAGNOSTIC LAPAROSCOPY N/A 12/6/2023    Procedure: DIAGNOSTIC LAPAROSCOPY, fulgeration of endometriosis;  Surgeon: Cristal Grace DO;  [place] [facility] [place] [facility] [place]  Service: Gynecology;  Laterality: N/A;    GUM SURGERY      Gum graft    KIDNEY STONE SURGERY  01/12/2021    w ureteral stent placement and removal    MULTIPLE TOOTH EXTRACTIONS      WISDOM TOOTH EXTRACTION       Family History   Problem Relation Age of Onset    Hypertension Mother     Polycystic ovary syndrome Paternal Grandmother     Colon cancer Maternal Grandmother     Breast cancer Maternal Grandmother     Lung cancer Maternal Grandmother     Hyperlipidemia Maternal Grandfather      Hypertension Maternal Grandfather     Deep vein thrombosis Maternal Aunt         Mat. Great Aunt    Breast cancer Maternal Aunt     Ovarian cancer Neg Hx     Uterine cancer Neg Hx     Pulmonary embolism Neg Hx        Home Medications:  Prior to Admission medications    Medication Sig Start Date End Date Taking? Authorizing Provider   Cyanocobalamin (Vitamin B-12) 1000 MCG sublingual tablet Place 1 tablet under the tongue Daily. 11/6/24   Mel Garcia APRN   docusate sodium (COLACE) 50 MG capsule Take 1 capsule by mouth Daily.    Edward Palmer MD   Fremanezumab-vfrm (Ajovy) 225 MG/1.5ML solution auto-injector Inject 225 mg under the skin into the appropriate area as directed Every 30 (Thirty) Days. 11/6/24   Mel Garcia APRN   lamoTRIgine (LaMICtal) 100 MG tablet Take 1 tablet by mouth every night at bedtime. 10/14/24   Edward Pamler MD   magnesium oxide (MAG-OX) 400 MG tablet Take 1 tablet by mouth Daily.    Edward Palmer MD   NON FORMULARY Take 1 tablet by mouth Daily. Evening primrose  Patient not taking: Reported on 11/6/2024    Edward Palmer MD   Omega-3 Fatty Acids (fish oil) 1000 MG capsule capsule Take 1 capsule by mouth Daily With Breakfast.    Edward Palmer MD   Pristiq 100 MG 24 hr tablet Take 1 tablet by mouth Daily. 8/1/23   Edward Palmer MD   rizatriptan MLT (MAXALT-MLT) 10 MG disintegrating tablet Place 1 tablet on the tongue 1 (One) Time As Needed for Migraine for up to 30 days. May repeat in 2 hours if needed 11/6/24 12/6/24  Mel Garcia APRN   vitamin E 200 UNIT capsule Take 180 Units by mouth Daily.  Patient not taking: Reported on 11/6/2024    Edward Palmer MD        Social History:   Social History     Tobacco Use    Smoking status: Never     Passive exposure: Never    Smokeless tobacco: Never   Vaping Use    Vaping status: Never Used   Substance Use Topics    Alcohol use: Not Currently    Drug use: Never  "        Review of Systems:  Review of Systems   Musculoskeletal:  Positive for back pain.   All other systems reviewed and are negative.       Physical Exam:  /91 (BP Location: Right arm, Patient Position: Sitting)   Pulse 86   Temp 98 °F (36.7 °C) (Oral)   Resp 17   Ht 157.5 cm (62\")   Wt 74.8 kg (165 lb)   SpO2 100%   BMI 30.18 kg/m²     Physical Exam  Vitals and nursing note reviewed.   Constitutional:       General: She is in acute distress (Appears in pain).      Appearance: Normal appearance. She is not toxic-appearing.   HENT:      Head: Normocephalic and atraumatic.      Mouth/Throat:      Mouth: Mucous membranes are moist.   Eyes:      General: No scleral icterus.  Cardiovascular:      Rate and Rhythm: Normal rate and regular rhythm.      Pulses:           Radial pulses are 2+ on the right side and 2+ on the left side.        Dorsalis pedis pulses are 2+ on the right side and 2+ on the left side.      Heart sounds: Normal heart sounds.   Pulmonary:      Effort: Pulmonary effort is normal. No respiratory distress.      Breath sounds: Normal breath sounds.   Abdominal:      General: Abdomen is protuberant. Bowel sounds are normal.      Palpations: Abdomen is soft.      Tenderness: There is no abdominal tenderness.   Musculoskeletal:         General: Normal range of motion.      Cervical back: Normal range of motion and neck supple.        Back:       Comments: No saddle anesthesia   Skin:     General: Skin is warm and dry.   Neurological:      Mental Status: She is alert and oriented to person, place, and time. Mental status is at baseline.                  Medical Decision Making:      Comorbidities that affect care:    Anxiety/depression, Hypertension    External Notes reviewed:    None      The following orders were placed and all results were independently analyzed by me:  Orders Placed This Encounter   Procedures    XR Spine Thoracic 3 View    XR Spine Lumbar 2 or 3 View       Medications " Given in the Emergency Department:  Medications   Lidocaine 4 % 1 patch (1 patch Transdermal Medication Applied 1/8/25 0929)   ketorolac (TORADOL) injection 30 mg (30 mg Intramuscular Given 1/8/25 0922)   orphenadrine (NORFLEX) injection 60 mg (60 mg Intramuscular Given 1/8/25 0922)        ED Course:    ED Course as of 01/08/25 1059   Wed Jan 08, 2025   1052 XR Spine Thoracic 3 View  No acute findings in thoracic or lumbar spine.  Reevaluated the patient and she feels much better though she still has some pain. [CW]      ED Course User Index  [CW] Nishi Banda APRN       Labs:    Lab Results (last 24 hours)       ** No results found for the last 24 hours. **             Imaging:    XR Spine Thoracic 3 View    Result Date: 1/8/2025  XR SPINE LUMBAR 2 OR 3 VW, XR SPINE THORACIC 3 VW Date of Exam: 1/8/2025 9:48 AM EST Indication: fall, back pain Comparison: None available. Findings: Thoracic vertebral body heights are maintained without evidence of acute fracture. There is no evidence of traumatic malalignment. Some minimal early spondylosis changes present with small thoracic osteophytes noted. Lumbar vertebral body heights are maintained without evidence of fracture. Alignment remains anatomic. There is no evidence of significant thoracic spondylosis.     Impression: No evidence of fracture or traumatic malalignment of the thoracic and lumbar spine. Electronically Signed: Jarrod Cade MD  1/8/2025 10:06 AM EST  Workstation ID: [id number]    XR Spine Lumbar 2 or 3 View    Result Date: 1/8/2025  XR SPINE LUMBAR 2 OR 3 VW, XR SPINE THORACIC 3 VW Date of Exam: 1/8/2025 9:48 AM EST Indication: fall, back pain Comparison: None available. Findings: Thoracic vertebral body heights are maintained without evidence of acute fracture. There is no evidence of traumatic malalignment. Some minimal early spondylosis changes present with small thoracic osteophytes noted. Lumbar vertebral body heights are maintained without  evidence of fracture. Alignment remains anatomic. There is no evidence of significant thoracic spondylosis.     Impression: No evidence of fracture or traumatic malalignment of the thoracic and lumbar spine. Electronically Signed: Jarrod Cade MD  1/8/2025 10:06 AM EST  Workstation ID: [id number]       Differential Diagnosis and Discussion:    Back Pain: The patient presents with back pain. My differential diagnosis includes but is not limited to acute spinal epidural abscess, acute spinal epidural bleed, cauda equina syndrome, abdominal aortic aneurysm, aortic dissection, kidney stone, pyelonephritis, musculoskeletal back pain, spinal fracture, and osteoarthritis.     PROCEDURES:    X-ray were performed in the emergency department and all X-ray impressions were independently interpreted by me.    No orders to display       Procedures    MDM  Number of Diagnoses or Management Options  Acute left-sided low back pain, unspecified whether sciatica present  Contusion of left side of back, initial encounter  Diagnosis management comments: The patient´s symptoms are consistent with musculoskeletal back pain. The patient is now resting comfortably, feels better, is alert, talkative, interactive and in no distress. The repeat examination is unremarkable and benign. The patient is neurologically intact and is ambulatory in the ED. The patient has no fever, no bowel or bladder incontinence, no saddle anesthesia, and is otherwise alert and well appearing. The history, physical exam, and diagnostics (if any) do not suggest the presence of acute spinal epidural abscess, acute spinal epidural bleed, cauda equina syndrome, abdominal aortic aneurysm, aortic dissection or other process requiring further testing, treatment or consultation in the emergency department. The vital signs have been stable. The patient's condition is stable and appropriate for discharge. The patient will pursue further outpatient evaluation with the primary  care physician or other designated for consulting position as indicated in the discharge instructions.       Amount and/or Complexity of Data Reviewed  Tests in the radiology section of CPT®: reviewed and ordered    Risk of Complications, Morbidity, and/or Mortality  Presenting problems: minimal  Diagnostic procedures: minimal  Management options: minimal    Patient Progress  Patient progress: improved                       Patient Care Considerations:    NARCOTICS: I considered prescribing opiate pain medication as an outpatient, however pain was improved after NSAIDs and muscle relaxer.      Consultants/Shared Management Plan:    Consultant: I have discussed the case with Merlene Colunga PT, who evaluated the patient and provided her with exercises to do at home.    Social Determinants of Health:    Patient is independent, reliable, and has access to care.       Disposition and Care Coordination:    Discharged: The patient is suitable and stable for discharge with no need for consideration of admission.    I have explained discharge medications and the need for follow up with the patient/caretakers. This was also printed in the discharge instructions. Patient was discharged with the following medications and follow up:      Medication List        New Prescriptions      ketorolac 10 MG tablet  Commonly known as: TORADOL  Take 1 tablet by mouth Every 6 (Six) Hours As Needed for Moderate Pain for up to 5 days.     lidocaine 5 %  Commonly known as: LIDODERM  Place 1 patch on the skin as directed by provider Daily. Remove & Discard patch within 12 hours or as directed by MD     methocarbamol 500 MG tablet  Commonly known as: ROBAXIN  Take 1 tablet by mouth 4 (Four) Times a Day As Needed for Muscle Spasms.               Where to Get Your Medications        [id number] - [place], [place] - [address] AT [facility] [phone] [facility] [phone] FX   [address] [facility] [address]      Phone: [phone]   ketorolac 10 MG tablet  lidocaine 5 %  methocarbamol 500 MG tablet      Teetee Marshall, APRN  [address] [facility]  [address]  [place] [address]  [phone]    Call   As needed       Final diagnoses:   Acute left-sided low back pain, unspecified whether sciatica present   Contusion of left side of back, initial encounter        ED Disposition       ED Disposition   Discharge    Condition   Stable    Comment   --               This medical record created using voice recognition software.             Nishi Banda, APRN  01/08/25 [id number]

## 2025-01-08 NOTE — DISCHARGE INSTRUCTIONS
He likely contused your back when you landed on the steps.  You may also have a muscle spasm.  I prescribed you Toradol which is an anti-inflammatory which she can take for the next 5 days.  Also prescribed you Robaxin which is a muscle relaxer.  It might make you little bit drowsy.  Apply the Lidoderm patches 12 hours on, 12 hours off.  You can try warm moist compresses alternating with cold packs several times per day.  You can also purchase Arnica gel at the drugstore and apply this several times per day for additional pain relief.    Avoid sitting for prolonged times.  You may want to look into purchasing a stand-up desk.    Return for worsening symptoms or new concerns.

## 2025-01-08 NOTE — Clinical Note
[facility] EMERGENCY ROOM  [address] [place] [address]  Phone: [phone]  Fax: [phone]    Ana Brito was seen and treated in our emergency department on 1/8/2025.  She may return to work on 01/11/2025.         Thank you for choosing [facility].    Nishi Banda APRN

## 2025-01-09 ENCOUNTER — TELEPHONE (OUTPATIENT)
Dept: NEUROLOGY | Facility: CLINIC | Age: 30
End: 2025-01-09
Payer: COMMERCIAL

## 2025-01-09 NOTE — TELEPHONE ENCOUNTER
Caller: Ana Brito    Relationship: Self    Best call back number: [phone]    What orders are you requesting (i.e. lab or imaging): MRI OF BACK/SPINE    In what timeframe would the patient need to come in:   ASAP    Where will you [facility] your lab/imaging services[facility]    Additional notes:   PT FEEL ON STAIRS WHILE TAKING HER DOG OUT. SHE SLIPPED ON ICE AND LANDED ON HER BACK ON THE STAIRS.    SHE HAS HOT SHOOTING PAIN IN LOWER BACK AND AROUND TO HER LEFT SIDE.    PLEASE SEE ED NOTES FROM YESTERDAY. XRAYS WERE DONE AND PT SENT HOME WITH PAIN PILL, LIDOCAINE PATCH, AND MUSCLE RELAXER. THESE RX HELP SOME BUT NOT ENOUGH THAT PT CAN WALK OR MOVE.TODAY PT ISN'T ABLE TO WALK WITHOUT ASSISTANCE. SHE HAS TO HAVE ASSISTANCE JUST TO MOVE.    PLEASE SEND MY CHART MESSAGE WHEN DONE OR IF ANY QUESTIONS. IF YOU CALL PT, PLEASE LVM.

## 2025-01-09 NOTE — TELEPHONE ENCOUNTER
Spoke with patient, instructed that Mel sees her for migraines and would not be able to place the orders that patient requested. Recommended that patient call her PCP.Patient voiced understanding and agreement.

## 2025-01-27 RX ORDER — FREMANEZUMAB-VFRM 225 MG/1.5ML
INJECTION SUBCUTANEOUS
Qty: 1.5 ML | Refills: 5 | OUTPATIENT
Start: 2025-01-27

## 2025-02-25 ENCOUNTER — PRIOR AUTHORIZATION (OUTPATIENT)
Dept: NEUROLOGY | Facility: CLINIC | Age: 30
End: 2025-02-25
Payer: COMMERCIAL

## 2025-02-25 NOTE — PROGRESS NOTES
"Well Woman Visit    CC: Scheduled annual well gyn visit  Chief Complaint   Patient presents with    Annual Exam     Vaginal bump, possible blood blister x 1 year, hard       HPI:   29 y.o.   Social History     Substance and Sexual Activity   Sexual Activity Yes    Partners: Male    Birth control/protection: Vasectomy     IGP,rfx Aptima HPV All Pth (2024 11:10) Pap only negative    Progress Notes by Cristal Grace, DO (2024 10:15)    Hx PE w nuvaring, endometriosis by L/S, pelvic congestion, PCOS, PMDD    Menses:   q mo, lasts 5 days, changes products q 1-2hrs on heaviest days, 2 days are heavy, not affecting ADLs.   Pain with menses:  cramping, all left sided. Worsening about a week before menses.  Aleeve helps. Worse for last 6mo.    Vag bump, feels hard.  No change, still small.      Does get preventative labs w psych    History: PMHx, Meds, Allergies, PSHx, Social Hx, and POBHx all reviewed and updated.    PHYSICAL EXAM:  /97   Pulse 67   Ht 157.5 cm (62\")   Wt 77.1 kg (170 lb)   LMP 2025 (Approximate)   Breastfeeding No   BMI 31.09 kg/m²  Not found.   Chaperone present during breast and/or pelvic exam if performed.  General- NAD, alert and oriented, appropriate  Psych- Normal mood, good memory  Neck- No masses, no thyroid enlargement  CV- Regular rhythm, no murmurs  Resp- CTA to bases, no wheezes  Abdomen- Soft, non distended, non tender, no masses    Breast left-  Bilaterally symmetrical, no masses, non tender, no nipple discharge, no axillary or supraclavicular nodes palpable.    Breast right- Bilaterally symmetrical, no masses, non tender, no nipple discharge, no axillary or supraclavicular nodes palpable.      External genitalia- Normal female, left majora w 1mm pinpoint vessel  Urethra/meatus- Normal, no masses, non tender  Bladder- Normal, no masses, non tender  Vagina- Normal, no atrophy, no lesions, no discharge.    Prolapse : none noted   Cvx- Normal, no lesions, no " discharge, No cervical motion tenderness  Uterus- Normal size, shape & consistency.  Non tender, mobile.  Adnexa- Tender on LEFT, no mass.  RIGHT no mass, non tender  Anus/Rectum/Perineum- Not performed    Lymphatic- No palpable neck, axillary, or groin nodes  Ext- No edema, no cyanosis    Skin- No lesions, no rashes, no acanthosis nigricans      ASSESSMENT and PLAN:    Diagnoses and all orders for this visit:    1. Well woman exam (Primary)    2. Pelvic pain  Comments:  Discussed DDX, reviewed option of US.  Pt declines.    3. Endometriosis determined by laparoscopy  Comments:  hx of, by hx suspect pain due to endometriosis  Overview:  July 2023 US wnl except multiple bilat ov follicles  Norethindrone, SE, decreased sex drive  Slynd, SE, decreased sex drive and acne  Refractory to medical management.  L/S Dec 2023  Op Note by Cristal Grace DO (12/06/2023 11:37)    2/27/2025 discussed sxs cw endometriosis, declines meds or US.  Feels good otherwise and will let me know.  Track menses and pain.      4. Pelvic congestion  Comments:  hx of    5. Hx of pulmonary embolus  Overview:  With nuvaring 2018, thrombophilia panel neg  S/p 6mo anticoagulation  Stable on no anticoagulation      6. Elevated blood pressure reading    7. Family history of breast and colon cancer  Overview:  MGM 51yo breast cancer, passed w colon cancer  Pts mother unable to be tested  2/26/2024 info sent for possible VistaSeq          Preventative:  BREAST HEALTH- Monthly self breast exam importance and how to reviewed. MMG and/or MRI (prn) reviewed per society guidelines and her individual history. Screen: Not medically needed  CERVICAL CANCER Screening- Reviewed current ASCCP guidelines for screening w and wo cotest HPV, age specific.  Screen: Already up to date  COLON CANCER Screening- Reviewed current medical society guidelines and options.  Screen:  Not medically needed  Importance of WEIGHT MANAGEMENT, nutrition, and exercise reviewed.  Ideal BMI  discussed  BONE HEALTH- Reviewed current medical society guidelines and options for testing, calcium and vit D intake.  Weight bearing exercise.  DEXA: Not medically needed  VACCINATIONS Recommended: Covid vaccine, Flu annually.  Importance discussed, risk being unvaccinated reviewed.  Questions answered  Smoking status- NON SMOKER/VAPER  Follow up PCP/Specialist PMHx and/or Labs    Reassurance regarding normal vulvar blood vessel.  Return to office if enlarging or painful.        TRACK MENSES, RTO if <q21 days (frequent) or >q3mo (infrequent IF not on hormonal BC), >7d long, heavy, or painful.    BLOOD PRESSURE elevated today was discussed.  I recommend she monitor her BP at home.  If systolic stays persistently 135 or higher OR diastolic stays 85 or higher that she FU w her PCP.  Questions answered.  Risks discussed.  Pt agrees.    She understands the importance of having any ordered tests to be performed in a timely fashion.  The risks of not performing them include, but are not limited to, advanced cancer stages, bone loss from osteoporosis and/or subsequent increase in morbidity and/or mortality.  She is encouraged to review her results online and/or contact or office if she has questions.     Follow Up:  Return if symptoms worsen or fail to improve AND 1 year WWE.            Cristal Grace, DO  02/27/2025    [facility]  [facility] [place] [address]  Dept: [phone]  Dept Fax: [phone]  Loc: [phone]  Loc Fax: [phone]

## 2025-02-25 NOTE — TELEPHONE ENCOUNTER
Request Reference Number: [id number]. AJOVY /1.5 is approved through 02/25/2026. Your patient may now fill this prescription and it will be covered.  Effective Date: 2/25/2025  Authorization Expiration Date: 2/25/2026

## 2025-02-26 PROBLEM — D72.819 LEUKOPENIA: Status: RESOLVED | Noted: 2023-12-06 | Resolved: 2025-02-26

## 2025-02-27 ENCOUNTER — OFFICE VISIT (OUTPATIENT)
Dept: OBSTETRICS AND GYNECOLOGY | Facility: CLINIC | Age: 30
End: 2025-02-27
Payer: COMMERCIAL

## 2025-02-27 VITALS
BODY MASS INDEX: 31.28 KG/M2 | WEIGHT: 170 LBS | HEIGHT: 62 IN | SYSTOLIC BLOOD PRESSURE: 145 MMHG | HEART RATE: 67 BPM | DIASTOLIC BLOOD PRESSURE: 97 MMHG

## 2025-02-27 DIAGNOSIS — R03.0 ELEVATED BLOOD PRESSURE READING: ICD-10-CM

## 2025-02-27 DIAGNOSIS — N80.9 ENDOMETRIOSIS DETERMINED BY LAPAROSCOPY: ICD-10-CM

## 2025-02-27 DIAGNOSIS — R10.2 PELVIC PAIN: ICD-10-CM

## 2025-02-27 DIAGNOSIS — Z86.711 HX OF PULMONARY EMBOLUS: ICD-10-CM

## 2025-02-27 DIAGNOSIS — N94.89 PELVIC CONGESTION: ICD-10-CM

## 2025-02-27 DIAGNOSIS — Z01.419 WELL WOMAN EXAM: Primary | ICD-10-CM

## 2025-02-27 DIAGNOSIS — Z80.9 FAMILY HISTORY OF CANCER: ICD-10-CM

## 2025-02-27 RX ORDER — IBUPROFEN 600 MG/1
600 TABLET, FILM COATED ORAL AS NEEDED
COMMUNITY
Start: 2025-01-13

## 2025-05-06 ENCOUNTER — OFFICE VISIT (OUTPATIENT)
Dept: NEUROLOGY | Facility: CLINIC | Age: 30
End: 2025-05-06
Payer: COMMERCIAL

## 2025-05-06 VITALS
HEART RATE: 73 BPM | WEIGHT: 169.5 LBS | HEIGHT: 62 IN | BODY MASS INDEX: 31.19 KG/M2 | DIASTOLIC BLOOD PRESSURE: 90 MMHG | SYSTOLIC BLOOD PRESSURE: 134 MMHG

## 2025-05-06 DIAGNOSIS — E53.8 B12 DEFICIENCY: ICD-10-CM

## 2025-05-06 DIAGNOSIS — G43.019 INTRACTABLE MIGRAINE WITHOUT AURA AND WITHOUT STATUS MIGRAINOSUS: Primary | ICD-10-CM

## 2025-05-06 RX ORDER — MAGNESIUM 200 MG
1000 TABLET ORAL DAILY
Qty: 30 EACH | Refills: 5 | Status: SHIPPED | OUTPATIENT
Start: 2025-05-06

## 2025-05-06 RX ORDER — FREMANEZUMAB-VFRM 225 MG/1.5ML
225 INJECTION SUBCUTANEOUS
Qty: 1.5 ML | Refills: 5 | Status: SHIPPED | OUTPATIENT
Start: 2025-05-06

## 2025-05-06 RX ORDER — PROPRANOLOL HYDROCHLORIDE 10 MG/1
10 TABLET ORAL 2 TIMES DAILY
COMMUNITY

## 2025-05-06 RX ORDER — CHOLECALCIFEROL (VITAMIN D3) 125 MCG
1 CAPSULE ORAL DAILY
COMMUNITY
Start: 2025-02-07

## 2025-05-06 NOTE — PROGRESS NOTES
"Chief Complaint  Migraine    Subjective          Ana Flori Brito presents to [facility]  History of Present Illness    History of Present Illness  The patient presents to the office for follow-up of migraine and B12 deficiency.    She remains on Ajovy for preventative therapy and uses rizatriptan as needed. She reports that her headaches have been well-managed, with the exception of the past week when she experienced a slight increase in frequency. She estimates experiencing 3 to 4 headache days per month, which are generally mild in nature. She has not required rizatriptan for an extended period but notes that it typically provides relief when needed. However, she expresses discomfort with the side effects of rizatriptan, describing a sensation of feeling \"weird\" and \"extreme\" for approximately 20 minutes post-administration. This necessitates a period of rest following medication intake. She also mentions an aversion to the taste of rizatriptan.     Her primary care physician has reintroduced propranolol into her treatment regimen due to persistently elevated blood pressure levels. She has B12 deficiency, which she takes sublingual B12 for.    INTERVAL: Since last visit, her primary care physician has reintroduced propranolol due to elevated blood pressure levels.    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   Pulmonary:      Effort: Pulmonary effort is normal.   Neurological:      Mental Status: She is alert and " oriented to person, place, and time.      Sensory: Sensation is intact.      Motor: Motor function is intact.      Coordination: Coordination is intact.      Deep Tendon Reflexes: Reflexes are normal and symmetric.        Neurological Exam  Mental Status  Alert. Oriented to person, place, and time.    Sensory  Normal sensation.    Reflexes  Deep tendon reflexes are 2+ and symmetric in all four extremities.    Coordination    Finger-to-nose, rapid alternating movements and heel-to-shin normal bilaterally without dysmetria.      Result Review :   CBC:  Lab Results   Component Value Date    WBC 4.51 06/27/2024    RBC 4.72 06/27/2024    HGB 13.3 06/27/2024    HCT 42.0 06/27/2024    MCV 89.0 06/27/2024    MCH 28.2 06/27/2024    MCHC 31.7 06/27/2024    RDW 13.4 06/27/2024     06/27/2024     CMP:  Lab Results   Component Value Date    BUN 16 06/27/2024    CREATININE 0.94 06/27/2024    EGFRIFNONA 104 08/25/2021     06/27/2024    K 3.8 06/27/2024     06/27/2024    CALCIUM 9.1 06/27/2024    ALBUMIN 5.1 07/19/2023    BILITOT 0.5 07/19/2023    ALKPHOS 99 07/19/2023    AST 21 07/19/2023    ALT 17 07/19/2023     TSH/FREE T4:   Lab Results   Component Value Date    TSH 2.010 06/13/2023    FREET4 1.04 06/13/2023                Assessment and Plan    Diagnoses and all orders for this visit:    1. Intractable migraine without aura and without status migrainosus (Primary)    2. B12 deficiency    Other orders  -     Cyanocobalamin (Vitamin B-12) 1000 MCG sublingual tablet; Place 1 tablet under the tongue Daily.  Dispense: 30 each; Refill: 5  -     Fremanezumab-vfrm (Ajovy) 225 MG/1.5ML solution auto-injector; Inject 225 mg under the skin into the appropriate area as directed Every 30 (Thirty) Days.  Dispense: 1.5 mL; Refill: 5  -     ubrogepant (Ubrelvy) 100 MG tablet; Take 1 tablet by mouth 1 (One) Time As Needed (migraine) for up to 30 doses. One tablet at HA onset, may repeat once in 2 hours if needed.  Dispense:  10 tablet; Refill: 5        Assessment & Plan  1. Migraine.  Her migraines are currently well-managed with Ajovy for preventative therapy and rizatriptan as needed. However, she reports adverse effects from rizatriptan, including feeling extremely unwell for about 20-30 minutes after taking it. A prescription for Ubrelvy will be provided, with instructions to take one dose at the onset of a headache and repeat once after 2 hours if necessary. A co-pay card for Ubrelvy will be given today. Refills for Ajovy have been sent.    2. B12 deficiency.  She is currently taking sublingual B12 for her B12 deficiency. Refills for B12 have been sent.    Follow-up  The patient will follow up in 6 months.         Follow Up   Return in about 6 months (around 11/6/2025).  Patient was given instructions and counseling regarding her condition or for health maintenance advice. Please see specific information pulled into the AVS if appropriate.       Patient or patient representative verbalized consent for the use of Ambient Listening during the visit with  KYAW Hinton for chart documentation. 5/6/2025  09:14 EDT

## 2025-05-07 ENCOUNTER — PRIOR AUTHORIZATION (OUTPATIENT)
Dept: NEUROLOGY | Facility: CLINIC | Age: 30
End: 2025-05-07
Payer: COMMERCIAL

## 2025-05-08 NOTE — TELEPHONE ENCOUNTER
Request Reference Number: [id number]. UBRELVY TAB 100MG is approved through 08/07/2025. Your patient may now fill this prescription and it will be covered.  Effective Date: 5/7/2025  Authorization Expiration Date: 8/7/2025

## 2025-05-14 RX ORDER — CHOLECALCIFEROL (VITAMIN D3) 125 MCG
1 CAPSULE ORAL DAILY
Qty: 30 TABLET | OUTPATIENT
Start: 2025-05-14

## 2025-05-21 ENCOUNTER — PATIENT MESSAGE (OUTPATIENT)
Dept: NEUROLOGY | Facility: CLINIC | Age: 30
End: 2025-05-21
Payer: COMMERCIAL

## 2025-05-21 RX ORDER — CHOLECALCIFEROL (VITAMIN D3) 25 MCG
TABLET,CHEWABLE ORAL
Qty: 30 LOZENGE | OUTPATIENT
Start: 2025-05-21

## 2025-05-22 ENCOUNTER — TELEPHONE (OUTPATIENT)
Dept: OBSTETRICS AND GYNECOLOGY | Age: 30
End: 2025-05-22
Payer: COMMERCIAL

## 2025-05-22 NOTE — PROGRESS NOTES
GYN Visit    Chief Complaint   Patient presents with    Follow-up     Endometriosis pain is back         HPI:   30 y.o. LMP: Patient's last menstrual period was 2025 (exact date).     Social History     Substance and Sexual Activity   Sexual Activity Yes    Partners: Male    Birth control/protection: Vasectomy     IGP,rfx Aptima HPV All Pth (2024 11:10) Pap only negative     Office Visit with Cristal Grace DO (2025)    Hx PE w nuvaring, endometriosis by L/S, pelvic congestion, PCOS, PMDD,  kidney stones    No period in nov, feb, or april  Menses:   q 1-2mo, lasts 5 days, changes products q 1-2hrs on heaviest days.   Pelvic pain:  starts week before menses, all left side, radiates into vagina and legs.  Some LBP too (hx injury).  Pain is constant.  Up to 4-5/10. Aleeve helps only a little     Now in pain since Monday, LMP 3May, not sure if going to start menses.    ROS: Had back pain, felt like passed right stone recently. No dysuria or hematuria.  No F/C/N/V.  Some diarrhea all day Monday, usually has constipation, not sure about IBS never had eval, bleeds w BM when constipated. Sex can be uncomfortable, on deep penetration.  Nocturia x1.        History: PMHx, Meds, Allergies, PSHx, Social Hx, and POBHx all reviewed and updated.    PHYSICAL EXAM:  /80   Wt 76.2 kg (168 lb)   LMP 2025 (Exact Date)   BMI 30.73 kg/m²   Facility age limit for growth %stephen is 20 years.   Chaperone present during breast and/or pelvic exam if performed.  General- NAD, alert and oriented, appropriate  Psych- Normal mood, good memory      ASSESSMENT AND PLAN:  Diagnoses and all orders for this visit:    1. Pelvic pain (Primary)  -     US Non-ob Transvaginal; Future    2. Endometriosis determined by laparoscopy  Comments:  Suspect the etiology of her pain  Overview:  L/S Dec 2023  Op Note by Cristal Grace DO (2023 11:37)  CLINICAL PHOTOGRAPHS - SCAN - DIAGNOSTIC LAPAROSCOPY PHOTO, JESIKA, 2023  (12/06/2023)    Norethindrone, SE, decreased sex drive  Slynd, SE, decreased sex drive and acne  Aleeve and Motrin minimal improvement  5/23/2025 Orilissa 150mg Qday    Orders:  -     US Non-ob Transvaginal; Future  -     elagolix (ORILISSA) 150 MG tablet tablet; Take 1 tablet by mouth Daily.  Dispense: 30 tablet; Refill: 12  -     Comprehensive Metabolic Panel    3. Pelvic congestion  -     US Non-ob Transvaginal; Future    4. Hx of pulmonary embolus  Comments:  Limits use of estrogen containing hormones for control of endometriosis pain  Overview:  With nuvaring 2018, thrombophilia panel neg  S/p 6mo anticoagulation  Stable on no anticoagulation      5. PCOS (polycystic ovarian syndrome)  Comments:  History of of, suspect etiology of irregular menses  Overview:  Criteria w anovulation and US appearance of ovaries  June 2023 nl TFTs, prolactin mildly low, normal DHEA-S, normal 17 OHP, nl testosterone  July 2023 US wnl except multiple bilat ov follicles, no insulin resistance.  Elev chol/LDL      6. PMDD (premenstrual dysphoric disorder)  Comments:  Improved  Overview:  On Pristiq w PCP      7. Diarrhea, unspecified type  -     Ambulatory Referral to Gastroenterology    8. Other constipation  -     Ambulatory Referral to Gastroenterology    9. Hematochezia  -     Ambulatory Referral to Gastroenterology      Today we discussed with endometriosis and her description of pain this is consistent with pain from endometriosis.  We discussed other chronic pain syndromes that are commonly found with endometriosis.  With her GI symptoms I feel it is reasonable to get a GI consultation to rule out other etiologies of her pain.  We discussed bladder pain syndrome.  She declines consultation with urogynecology.  Recommend pelvic ultrasound and follow-up afterwards.  We discussed the options of Orilissa or Lupron.  Risk benefits alternatives side effects efficacy of each.  She desires to start Orilissa.  She does have  dyspareunia, I would like to start the lowest dose Orilissa and if that works continue for up to 2 years.  If not we could consider high dose Orilissa.        Follow Up:  Return for FU US.          Cristal Grace,   05/23/2025    [facility] [address] [facility]  [address] [place] [address] [address] [place] [address]  Dept: [phone]  Dept Fax: [phone]  Loc: [phone]  Loc Fax: [phone]

## 2025-05-23 ENCOUNTER — OFFICE VISIT (OUTPATIENT)
Dept: OBSTETRICS AND GYNECOLOGY | Age: 30
End: 2025-05-23
Payer: COMMERCIAL

## 2025-05-23 VITALS — BODY MASS INDEX: 30.73 KG/M2 | WEIGHT: 168 LBS | SYSTOLIC BLOOD PRESSURE: 122 MMHG | DIASTOLIC BLOOD PRESSURE: 80 MMHG

## 2025-05-23 DIAGNOSIS — Z86.711 HX OF PULMONARY EMBOLUS: ICD-10-CM

## 2025-05-23 DIAGNOSIS — N94.89 PELVIC CONGESTION: ICD-10-CM

## 2025-05-23 DIAGNOSIS — E28.2 PCOS (POLYCYSTIC OVARIAN SYNDROME): ICD-10-CM

## 2025-05-23 DIAGNOSIS — N80.9 ENDOMETRIOSIS DETERMINED BY LAPAROSCOPY: ICD-10-CM

## 2025-05-23 DIAGNOSIS — K59.09 OTHER CONSTIPATION: ICD-10-CM

## 2025-05-23 DIAGNOSIS — R19.7 DIARRHEA, UNSPECIFIED TYPE: ICD-10-CM

## 2025-05-23 DIAGNOSIS — R10.2 PELVIC PAIN: Primary | ICD-10-CM

## 2025-05-23 DIAGNOSIS — K92.1 HEMATOCHEZIA: ICD-10-CM

## 2025-05-23 DIAGNOSIS — F32.81 PMDD (PREMENSTRUAL DYSPHORIC DISORDER): ICD-10-CM

## 2025-05-23 LAB
ALBUMIN SERPL-MCNC: 4.5 G/DL (ref 3.5–5.2)
ALBUMIN/GLOB SERPL: 2 G/DL
ALP SERPL-CCNC: 107 U/L (ref 39–117)
ALT SERPL W P-5'-P-CCNC: 28 U/L (ref 1–33)
ANION GAP SERPL CALCULATED.3IONS-SCNC: 8.4 MMOL/L (ref 5–15)
AST SERPL-CCNC: 25 U/L (ref 1–32)
BILIRUB SERPL-MCNC: 0.3 MG/DL (ref 0–1.2)
BUN SERPL-MCNC: 14 MG/DL (ref 6–20)
BUN/CREAT SERPL: 13.7 (ref 7–25)
CALCIUM SPEC-SCNC: 9.6 MG/DL (ref 8.6–10.5)
CHLORIDE SERPL-SCNC: 103 MMOL/L (ref 98–107)
CO2 SERPL-SCNC: 25.6 MMOL/L (ref 22–29)
CREAT SERPL-MCNC: 1.02 MG/DL (ref 0.57–1)
EGFRCR SERPLBLD CKD-EPI 2021: 76.5 ML/MIN/1.73
GLOBULIN UR ELPH-MCNC: 2.2 GM/DL
GLUCOSE SERPL-MCNC: 77 MG/DL (ref 65–99)
POTASSIUM SERPL-SCNC: 4.4 MMOL/L (ref 3.5–5.2)
PROT SERPL-MCNC: 6.7 G/DL (ref 6–8.5)
SODIUM SERPL-SCNC: 137 MMOL/L (ref 136–145)

## 2025-05-23 PROCEDURE — 80053 COMPREHEN METABOLIC PANEL: CPT | Performed by: OBSTETRICS & GYNECOLOGY

## 2025-05-23 RX ORDER — CHOLECALCIFEROL (VITAMIN D3) 125 MCG
1 CAPSULE ORAL DAILY
Qty: 90 TABLET | Refills: 1 | Status: SHIPPED | OUTPATIENT
Start: 2025-05-23

## 2025-05-26 PROBLEM — R79.89 ELEVATED SERUM CREATININE: Status: ACTIVE | Noted: 2025-05-26

## 2025-05-28 ENCOUNTER — TELEPHONE (OUTPATIENT)
Dept: OBSTETRICS AND GYNECOLOGY | Age: 30
End: 2025-05-28
Payer: COMMERCIAL

## 2025-05-30 ENCOUNTER — LAB (OUTPATIENT)
Facility: HOSPITAL | Age: 30
End: 2025-05-30
Payer: COMMERCIAL

## 2025-05-30 PROCEDURE — 80053 COMPREHEN METABOLIC PANEL: CPT | Performed by: OBSTETRICS & GYNECOLOGY

## 2025-06-06 ENCOUNTER — HOSPITAL ENCOUNTER (OUTPATIENT)
Dept: ULTRASOUND IMAGING | Facility: HOSPITAL | Age: 30
Discharge: HOME | End: 2025-06-06
Admitting: OBSTETRICS & GYNECOLOGY
Payer: COMMERCIAL

## 2025-06-06 DIAGNOSIS — N80.9 ENDOMETRIOSIS DETERMINED BY LAPAROSCOPY: ICD-10-CM

## 2025-06-06 DIAGNOSIS — N94.89 PELVIC CONGESTION: ICD-10-CM

## 2025-06-06 DIAGNOSIS — R10.2 PELVIC PAIN: ICD-10-CM

## 2025-06-06 PROCEDURE — 76830 TRANSVAGINAL US NON-OB: CPT

## 2025-06-10 PROBLEM — R10.2 PELVIC PAIN: Status: ACTIVE | Noted: 2025-06-10

## 2025-06-25 ENCOUNTER — TELEPHONE (OUTPATIENT)
Dept: OBSTETRICS AND GYNECOLOGY | Age: 30
End: 2025-06-25
Payer: COMMERCIAL

## 2025-06-25 NOTE — TELEPHONE ENCOUNTER
PROVIDER:  DO BRANTLEY    Patient: LUIS ENRIQUE WYNNE    Phone#: [phone]    REASON FOR THE CALL:PATIENT HAD AN US ON 6/6/25 AND NOT ONE HAS FOLLOWED UP YET FOR RESULT AND THERE WAS NOT A FOLLOW UP APPT SCHEDULED//PLEASE FOLLOW UP HUB WASN'T ABLE TO REACH THE OFFICE

## 2025-06-25 NOTE — TELEPHONE ENCOUNTER
Patient called. She was to have an appointment after her ultrasound and it was never scheduled. Appointment made to go over her results.

## 2025-06-30 NOTE — PROGRESS NOTES
"GYN Visit    Chief Complaint   Patient presents with    FUUS  LLQ pain, hx pelvic congestion and endometriosis       HPI:   30 y.o. LMP: Patient's last menstrual period was 2025.     Social History     Substance and Sexual Activity   Sexual Activity Yes    Partners: Male    Birth control/protection: Vasectomy     IGP,rfx Aptima HPV All Pth (2024 11:10) Pap only negative    US Non-ob Transvaginal (2025 16:49)  Comprehensive Metabolic Panel (2025 16:41)    Progress Notes by Cristal Grace,  (2025 09:00)    Hx PE w nuvaring, endometriosis by L/S, pelvic congestion, PCOS, PMDD,  kidney stones, elev Cr has FU Nephrology later this year    Pelvic pain starts 1-week before menses and on the left side radiating to vagina and legs.  Pain is constant up to 4-5 out of 10.  Aleve only helps a little bit.  With her history of endometriosis started on Orilissa in May.  Did have GI symptoms including diarrhea, with alternating constipation and hematochezia.  S/p referral to gastroenterology.has apptment in Sept.    Here for follow-up after ultrasound which was within normal limits.    On Orilissa now for a month, missed 3d due to pharmacy supply.  Pain is better almost gone.  No menses since may 3rd.  Had one week had 3d very shearer has since gotten better.  Does have hot flashes not bad, only occasionally.  Wants to continue it.      History: PMHx, Meds, Allergies, PSHx, Social Hx, and POBHx all reviewed and updated.    PHYSICAL EXAM:  /94   Pulse 99   Ht 157.5 cm (62\")   Wt 76.7 kg (169 lb)   LMP 2025   BMI 30.91 kg/m²   Facility age limit for growth %stephen is 20 years.   Chaperone present during breast and/or pelvic exam if performed.  General- NAD, alert and oriented, appropriate  Psych- Normal mood, good memory      ASSESSMENT AND PLAN:  Diagnoses and all orders for this visit:    1. Endometriosis determined by laparoscopy (Primary)  Overview:  L/S Dec 2023  Op Note by Sanjay, " Cristal DO (12/06/2023 11:37)  CLINICAL PHOTOGRAPHS - SCAN - DIAGNOSTIC LAPAROSCOPY PHOTO, JESIKA, 12/06/2023 (12/06/2023)    Norethindrone, SE, decreased sex drive  Slynd, SE, decreased sex drive and acne  Aleeve and Motrin minimal improvement  5/23/2025 Orilissa 150mg Qday- pain improved      2. Dysmenorrhea  Comments:  Resolved    3. Pelvic pain  Comments:  Resolved  Overview:  Jun 2025 nl pelvic US, multiple ov follicles      4. Pelvic congestion  Comments:  Pain resolved    5. PCOS (polycystic ovarian syndrome)  Comments:  History of, noted on ultrasound reviewed with patient  Overview:  Criteria w anovulation and US appearance of ovaries  June 2023 nl TFTs, prolactin mildly low, normal DHEA-S, normal 17 OHP, nl testosterone  July 2023 US wnl except multiple bilat ov follicles, no insulin resistance.  Elev chol/LDL      6. Elevated serum creatinine  Overview:  5/26/2025 rec hydrate and recheck 1-2wks, Cr elev 1.28- sent to PCP    Assessment & Plan:  Has nephrology eval scheduled      7. Hx of pulmonary embolus  Overview:  With nuvaring 2018, thrombophilia panel neg  S/p 6mo anticoagulation  Stable on no anticoagulation      8. Family history of breast and colon cancer  Overview:  MGM 53yo breast cancer, passed w colon cancer  Pts mother unable to be tested  2/26/2024 info sent for possible VistaSeq  7/1/2025 Patient was deemed an appropriate candidate to consider hereditary cancer testing with VistaSeq. Information was provided to patient with details on how to contact genetic counselor to determine if pt is a candidate by medical society guidelines and review costs/precertification process w insurance.        9. Elevated blood pressure reading  Comments:  did not take meds today      BLOOD PRESSURE elevated today was discussed.  I recommend she monitor her BP at home.  FU w her PCP if her gab elevated.  Questions answered.  Risks discussed.  Pt agrees.      Follow Up:  Return if symptoms return AND March for WWE  as scheduled.          Cristal Grace,   07/01/2025    [facility] [address] [facility]  [address] [place] [address] [address] [place] [address]  Dept: [phone]  Dept Fax: [phone]  Loc: [phone]  Loc Fax: [phone]

## 2025-07-01 ENCOUNTER — OFFICE VISIT (OUTPATIENT)
Dept: OBSTETRICS AND GYNECOLOGY | Age: 30
End: 2025-07-01
Payer: COMMERCIAL

## 2025-07-01 VITALS
HEIGHT: 62 IN | BODY MASS INDEX: 31.1 KG/M2 | WEIGHT: 169 LBS | DIASTOLIC BLOOD PRESSURE: 94 MMHG | SYSTOLIC BLOOD PRESSURE: 138 MMHG | HEART RATE: 99 BPM

## 2025-07-01 DIAGNOSIS — Z80.9 FAMILY HISTORY OF CANCER: ICD-10-CM

## 2025-07-01 DIAGNOSIS — R03.0 ELEVATED BLOOD PRESSURE READING: ICD-10-CM

## 2025-07-01 DIAGNOSIS — R10.2 PELVIC PAIN: ICD-10-CM

## 2025-07-01 DIAGNOSIS — E28.2 PCOS (POLYCYSTIC OVARIAN SYNDROME): ICD-10-CM

## 2025-07-01 DIAGNOSIS — Z86.711 HX OF PULMONARY EMBOLUS: ICD-10-CM

## 2025-07-01 DIAGNOSIS — N94.89 PELVIC CONGESTION: ICD-10-CM

## 2025-07-01 DIAGNOSIS — N80.9 ENDOMETRIOSIS DETERMINED BY LAPAROSCOPY: Primary | ICD-10-CM

## 2025-07-01 DIAGNOSIS — R79.89 ELEVATED SERUM CREATININE: ICD-10-CM

## 2025-07-01 DIAGNOSIS — N94.6 DYSMENORRHEA: ICD-10-CM

## 2025-07-09 ENCOUNTER — PRIOR AUTHORIZATION (OUTPATIENT)
Dept: NEUROLOGY | Facility: CLINIC | Age: 30
End: 2025-07-09
Payer: COMMERCIAL

## 2025-07-09 NOTE — TELEPHONE ENCOUNTER
Approved today by Vidhya 2017 [facility]  Request Reference Number: [id number]. UBRELVY TAB 100MG is approved through 07/09/2027. Your patient may now fill this prescription and it will be covered.  Effective Date: 7/9/2025  Authorization Expiration Date: 7/9/2027

## 2025-08-08 ENCOUNTER — TRANSCRIBE ORDERS (OUTPATIENT)
Dept: ADMINISTRATIVE | Facility: HOSPITAL | Age: 30
End: 2025-08-08
Payer: COMMERCIAL

## 2025-08-08 DIAGNOSIS — R20.2 NUMBNESS AND TINGLING IN LEFT HAND: Primary | ICD-10-CM

## 2025-08-08 DIAGNOSIS — R20.0 NUMBNESS AND TINGLING IN LEFT HAND: Primary | ICD-10-CM

## 2025-08-08 DIAGNOSIS — M62.512 ATROPHY OF MUSCLE OF LEFT SHOULDER: ICD-10-CM

## 2025-08-13 ENCOUNTER — TRANSCRIBE ORDERS (OUTPATIENT)
Dept: ADMINISTRATIVE | Facility: HOSPITAL | Age: 30
End: 2025-08-13
Payer: COMMERCIAL

## 2025-08-13 DIAGNOSIS — M62.512 ATROPHY OF MUSCLE OF LEFT SHOULDER: ICD-10-CM

## 2025-08-13 DIAGNOSIS — R20.2 NUMBNESS AND TINGLING IN LEFT HAND: Primary | ICD-10-CM

## 2025-08-13 DIAGNOSIS — R20.0 NUMBNESS AND TINGLING IN LEFT HAND: Primary | ICD-10-CM

## 2025-08-15 ENCOUNTER — TRANSCRIBE ORDERS (OUTPATIENT)
Dept: ADMINISTRATIVE | Facility: HOSPITAL | Age: 30
End: 2025-08-15
Payer: COMMERCIAL

## 2025-08-15 DIAGNOSIS — E28.2 POLYCYSTIC OVARIES: ICD-10-CM

## 2025-08-15 DIAGNOSIS — N28.9 RENAL INSUFFICIENCY: ICD-10-CM

## 2025-08-15 DIAGNOSIS — I10 ESSENTIAL HYPERTENSION, BENIGN: Primary | ICD-10-CM

## 2025-08-15 DIAGNOSIS — N20.0 NEPHROLITHIASIS, URIC ACID: ICD-10-CM

## 2025-08-20 ENCOUNTER — HOSPITAL ENCOUNTER (OUTPATIENT)
Facility: HOSPITAL | Age: 30
Discharge: HOME | End: 2025-08-20
Admitting: STUDENT IN AN ORGANIZED HEALTH CARE EDUCATION/TRAINING PROGRAM
Payer: COMMERCIAL

## 2025-08-20 DIAGNOSIS — R20.2 NUMBNESS AND TINGLING IN LEFT HAND: ICD-10-CM

## 2025-08-20 DIAGNOSIS — M62.512 ATROPHY OF MUSCLE OF LEFT SHOULDER: ICD-10-CM

## 2025-08-20 DIAGNOSIS — R20.0 NUMBNESS AND TINGLING IN LEFT HAND: ICD-10-CM

## 2025-08-20 PROCEDURE — 95886 MUSC TEST DONE W/N TEST COMP: CPT

## 2025-08-20 PROCEDURE — 95909 NRV CNDJ TST 5-6 STUDIES: CPT

## 2025-08-22 ENCOUNTER — HOSPITAL ENCOUNTER (OUTPATIENT)
Dept: ULTRASOUND IMAGING | Facility: HOSPITAL | Age: 30
Discharge: HOME | End: 2025-08-22
Admitting: NURSE PRACTITIONER
Payer: COMMERCIAL

## 2025-08-22 DIAGNOSIS — I10 ESSENTIAL HYPERTENSION, BENIGN: ICD-10-CM

## 2025-08-22 DIAGNOSIS — N20.0 NEPHROLITHIASIS, URIC ACID: ICD-10-CM

## 2025-08-22 DIAGNOSIS — N28.9 RENAL INSUFFICIENCY: ICD-10-CM

## 2025-08-22 DIAGNOSIS — E28.2 POLYCYSTIC OVARIES: ICD-10-CM

## 2025-08-22 PROCEDURE — 76775 US EXAM ABDO BACK WALL LIM: CPT

## (undated) DEVICE — PCH SURG INST LAP 2 LF

## (undated) DEVICE — PAD GRND REM POLYHESIVE A/ DISP

## (undated) DEVICE — SOL IRR NACL 0.9PCT 1000ML

## (undated) DEVICE — SOL NACL 0.9PCT 1000ML

## (undated) DEVICE — LITHOTOMY-YELLOW FINS: Brand: MEDLINE INDUSTRIES, INC.

## (undated) DEVICE — INSUFFLATION NEEDLE TO ESTABLISH PNEUMOPERITONEUM.: Brand: INSUFFLATION NEEDLE

## (undated) DEVICE — SUT MNCRYL PLS ANTIB UD 4/0 PS2 18IN

## (undated) DEVICE — STERILE POLYISOPRENE POWDER-FREE SURGICAL GLOVES: Brand: PROTEXIS

## (undated) DEVICE — SLV SCD KN/LEN ADJ EXPRSS BLENDED MD 1P/U

## (undated) DEVICE — Device

## (undated) DEVICE — TOTAL TRAY, 16FR 10ML SIL FOLEY, URN: Brand: MEDLINE

## (undated) DEVICE — SHEET,DRAPE,70X85,STERILE: Brand: MEDLINE

## (undated) DEVICE — TROCAR: Brand: KII OPTICAL ACCESS SYSTEM

## (undated) DEVICE — 40585 XL ADVANCED TRENDELENBURG POSITIONING KIT: Brand: 40585 XL ADVANCED TRENDELENBURG POSITIONING KIT

## (undated) DEVICE — ENDOPATH XCEL BLADELESS TROCARS WITH STABILITY SLEEVES: Brand: ENDOPATH XCEL

## (undated) DEVICE — SKIN PREP TRAY W/CHG: Brand: MEDLINE INDUSTRIES, INC.

## (undated) DEVICE — SYR LL TP 10ML STRL

## (undated) DEVICE — ENDOPATH XCEL WITH OPTIVIEW TECHNOLOGY BLADELESS TROCARS WITH STABILITY SLEEVES: Brand: ENDOPATH XCEL OPTIVIEW

## (undated) DEVICE — TBG INSUFFLATION W/CPC CONNEC: Brand: MEDLINE INDUSTRIES, INC.

## (undated) DEVICE — COVADERM PLUS: Brand: DEROYAL

## (undated) DEVICE — ADHS LIQ MASTISOL 2/3ML

## (undated) DEVICE — TOWEL,OR,DSP,ST,BLUE,STD,4/PK,20PK/CS: Brand: MEDLINE

## (undated) DEVICE — GOWN,REINF,POLY,SIRUS,BRTH SLV,XLNG/XXL: Brand: MEDLINE

## (undated) DEVICE — KT ANTI FOG W/FLD AND SPNG

## (undated) DEVICE — RETRACTABLE L-HOOK LAPAROSCOPIC SEALER/DIVIDER: Brand: LIGASURE

## (undated) DEVICE — SOL IRRG H2O PL/BG 1000ML STRL

## (undated) DEVICE — NDL HYPO ECLPS SFTY 22G 1 1/2IN

## (undated) DEVICE — DUAL LUMEN STOMACH TUBE,ANTI-REFLUX VALVE: Brand: SALEM SUMP

## (undated) DEVICE — INTENDED FOR TISSUE SEPARATION, AND OTHER PROCEDURES THAT REQUIRE A SHARP SURGICAL BLADE TO PUNCTURE OR CUT.: Brand: BARD-PARKER ® CARBON RIB-BACK BLADES

## (undated) DEVICE — STERILE POLYISOPRENE POWDER-FREE SURGICAL GLOVES WITH EMOLLIENT COATING: Brand: PROTEXIS

## (undated) DEVICE — STRIP,CLOSURE,WOUND,MEDI-STRIP,1/2X4: Brand: MEDLINE

## (undated) DEVICE — LAPAROSCOPIC DISSECTOR: Brand: DEROYAL